# Patient Record
Sex: FEMALE | Race: BLACK OR AFRICAN AMERICAN | NOT HISPANIC OR LATINO | Employment: FULL TIME | RURAL
[De-identification: names, ages, dates, MRNs, and addresses within clinical notes are randomized per-mention and may not be internally consistent; named-entity substitution may affect disease eponyms.]

---

## 2020-10-02 ENCOUNTER — HISTORICAL (OUTPATIENT)
Dept: ADMINISTRATIVE | Facility: HOSPITAL | Age: 35
End: 2020-10-02

## 2020-10-03 LAB
ALBUMIN SERPL BCP-MCNC: 4.4 G/DL (ref 3.5–5)
ALBUMIN/GLOB SERPL: 1 {RATIO}
ALP SERPL-CCNC: 127 U/L (ref 37–98)
ALT SERPL W P-5'-P-CCNC: 19 U/L (ref 13–56)
AMYLASE SERPL-CCNC: 122 U/L (ref 25–115)
ANION GAP SERPL CALCULATED.3IONS-SCNC: 16 MMOL/L
APAP SERPL-MCNC: 3 UG/ML (ref 10–30)
AST SERPL W P-5'-P-CCNC: 11 U/L (ref 15–37)
BASOPHILS # BLD AUTO: 0.03 X10E3/UL (ref 0–0.2)
BASOPHILS NFR BLD AUTO: 0.5 % (ref 0–1)
BILIRUB SERPL-MCNC: 0.5 MG/DL (ref 0–1.2)
BILIRUB UR QL STRIP: NEGATIVE MG/DL
BUN SERPL-MCNC: 16 MG/DL (ref 7–18)
BUN/CREAT SERPL: 15.4
CALCIUM SERPL-MCNC: 10.7 MG/DL (ref 8.5–10.1)
CHLORIDE SERPL-SCNC: 95 MMOL/L (ref 98–107)
CLARITY UR: CLEAR
CO2 SERPL-SCNC: 26 MMOL/L (ref 21–32)
COLOR UR: YELLOW
CREAT SERPL-MCNC: 1.04 MG/DL (ref 0.55–1.02)
EOSINOPHIL # BLD AUTO: 0.04 X10E3/UL (ref 0–0.5)
EOSINOPHIL NFR BLD AUTO: 0.7 % (ref 1–4)
ERYTHROCYTE [DISTWIDTH] IN BLOOD BY AUTOMATED COUNT: 11.7 % (ref 11.5–14.5)
ETHANOL SERPL-MCNC: 1 MG/DL (ref 0–10)
GLOBULIN SER-MCNC: 4.5 G/DL (ref 2–4)
GLUCOSE SERPL-MCNC: 383 MG/DL (ref 74–106)
GLUCOSE UR STRIP-MCNC: >=1000 MG/DL
HCT VFR BLD AUTO: 41.9 % (ref 38–47)
HGB BLD-MCNC: 15 G/DL (ref 12–16)
IMM GRANULOCYTES # BLD AUTO: 0.01 X10E3/UL (ref 0–0.04)
IMM GRANULOCYTES NFR BLD: 0.2 % (ref 0–0.4)
KETONES UR STRIP-SCNC: 15 MG/DL
LEUKOCYTE ESTERASE UR QL STRIP: NEGATIVE LEU/UL
LIPASE SERPL-CCNC: 129 U/L (ref 73–393)
LYMPHOCYTES # BLD AUTO: 2.88 X10E3/UL (ref 1–4.8)
LYMPHOCYTES NFR BLD AUTO: 51 % (ref 27–41)
MCH RBC QN AUTO: 29.5 PG (ref 27–31)
MCHC RBC AUTO-ENTMCNC: 35.8 G/DL (ref 32–36)
MCV RBC AUTO: 82.3 FL (ref 80–96)
MONOCYTES # BLD AUTO: 0.54 X10E3/UL (ref 0–0.8)
MONOCYTES NFR BLD AUTO: 9.6 % (ref 2–6)
MPC BLD CALC-MCNC: 10 FL (ref 9.4–12.4)
NEUTROPHILS # BLD AUTO: 2.15 X10E3/UL (ref 1.8–7.7)
NEUTROPHILS NFR BLD AUTO: 38 % (ref 53–65)
NITRITE UR QL STRIP: NEGATIVE
NRBC # BLD AUTO: 0 X10E3/UL (ref 0–0)
NRBC, AUTO (.00): 0 /100 (ref 0–0)
PH UR STRIP: 5.5 PH UNITS (ref 5–8)
PLATELET # BLD AUTO: 270 X10E3/UL (ref 150–400)
POTASSIUM SERPL-SCNC: 4.3 MMOL/L (ref 3.5–5.1)
PROT SERPL-MCNC: 8.9 G/DL (ref 6.4–8.2)
PROT UR QL STRIP: NEGATIVE MG/DL
RBC # BLD AUTO: 5.09 X10E6/UL (ref 4.2–5.4)
RBC # UR STRIP: NEGATIVE ERY/UL
SALICYLATES SERPL-MCNC: 3.1 MG/DL (ref 3–30)
SODIUM SERPL-SCNC: 133 MMOL/L (ref 136–145)
SP GR UR STRIP: 1.01 (ref 1–1.03)
UROBILINOGEN UR STRIP-ACNC: 0.2 EU/DL
WBC # BLD AUTO: 5.65 X10E3/UL (ref 4.5–11)

## 2020-10-04 LAB
AMPHET UR QL SCN: POSITIVE NG/ML
BARBITURATES UR QL SCN: NEGATIVE NG/ML
BENZODIAZ METAB UR QL SCN: NEGATIVE NG/ML
CANNABINOIDS UR QL SCN: NEGATIVE NG/ML
COCAINE UR QL SCN: NEGATIVE NG/ML
OPIATES UR QL SCN: POSITIVE NG/ML
PCP UR QL SCN: NEGATIVE NG/ML

## 2022-02-28 ENCOUNTER — CLINICAL SUPPORT (OUTPATIENT)
Dept: PRIMARY CARE CLINIC | Facility: CLINIC | Age: 37
End: 2022-02-28

## 2022-02-28 DIAGNOSIS — Z02.89 ENCOUNTER FOR PHYSICAL EXAMINATION RELATED TO EMPLOYMENT: Primary | ICD-10-CM

## 2022-02-28 DIAGNOSIS — Z02.83 ENCOUNTER FOR DRUG SCREENING: ICD-10-CM

## 2022-02-28 PROCEDURE — 99000 SPECIMEN HANDLING OFFICE-LAB: CPT | Mod: ,,, | Performed by: NURSE PRACTITIONER

## 2022-02-28 PROCEDURE — 99000 PR SPECIMEN HANDLING,DR OFF->LAB: ICD-10-PCS | Mod: ,,, | Performed by: NURSE PRACTITIONER

## 2022-02-28 PROCEDURE — 99499 UNLISTED E&M SERVICE: CPT | Mod: ,,, | Performed by: NURSE PRACTITIONER

## 2022-02-28 PROCEDURE — 99499 PR PHYSICAL - BASIC NON DOT/CDL: ICD-10-PCS | Mod: ,,, | Performed by: NURSE PRACTITIONER

## 2022-02-28 NOTE — PROGRESS NOTES
Subjective:       Patient ID: Elieser Hendricks is a 36 y.o. female.    Chief Complaint: No chief complaint on file.    HPI  Review of Systems      Objective:      Physical Exam    Assessment:       Problem List Items Addressed This Visit    None     Visit Diagnoses     Encounter for physical examination related to employment    -  Primary    Encounter for drug screening              Plan:       See scanned documents in .

## 2022-10-18 ENCOUNTER — HOSPITAL ENCOUNTER (EMERGENCY)
Facility: HOSPITAL | Age: 37
Discharge: HOME | End: 2022-10-18
Attending: EMERGENCY MEDICINE

## 2022-10-18 VITALS
WEIGHT: 190 LBS | SYSTOLIC BLOOD PRESSURE: 144 MMHG | HEIGHT: 72 IN | DIASTOLIC BLOOD PRESSURE: 96 MMHG | BODY MASS INDEX: 25.73 KG/M2 | OXYGEN SATURATION: 96 % | RESPIRATION RATE: 16 BRPM | HEART RATE: 102 BPM | TEMPERATURE: 99 F
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
## 2022-10-18 LAB — GLUCOSE SERPL-MCNC: 351 MG/DL (ref 70–105)

## 2022-10-18 PROCEDURE — 96372 THER/PROPH/DIAG INJ SC/IM: CPT

## 2022-10-18 PROCEDURE — 82962 GLUCOSE BLOOD TEST: CPT

## 2022-10-18 PROCEDURE — 63600175 PHARM REV CODE 636 W HCPCS: Performed by: EMERGENCY MEDICINE

## 2022-10-18 PROCEDURE — 99283 EMERGENCY DEPT VISIT LOW MDM: CPT | Mod: ,,, | Performed by: EMERGENCY MEDICINE

## 2022-10-18 PROCEDURE — 99283 PR EMERGENCY DEPT VISIT,LEVEL III: ICD-10-PCS | Mod: ,,, | Performed by: EMERGENCY MEDICINE

## 2022-10-18 PROCEDURE — 90471 IMMUNIZATION ADMIN: CPT | Performed by: EMERGENCY MEDICINE

## 2022-10-18 PROCEDURE — 99284 EMERGENCY DEPT VISIT MOD MDM: CPT

## 2022-10-18 PROCEDURE — 90715 TDAP VACCINE 7 YRS/> IM: CPT | Performed by: EMERGENCY MEDICINE

## 2022-10-18 RX ORDER — AMOXICILLIN AND CLAVULANATE POTASSIUM 875; 125 MG/1; MG/1
1 TABLET, FILM COATED ORAL 2 TIMES DAILY
Qty: 20 TABLET | Refills: 0 | Status: SHIPPED | OUTPATIENT
Start: 2022-10-18

## 2022-10-18 RX ORDER — METFORMIN HYDROCHLORIDE 500 MG/1
500 TABLET ORAL 2 TIMES DAILY WITH MEALS
COMMUNITY

## 2022-10-18 RX ORDER — KETOROLAC TROMETHAMINE 30 MG/ML
30 INJECTION, SOLUTION INTRAMUSCULAR; INTRAVENOUS
Status: COMPLETED | OUTPATIENT
Start: 2022-10-18 | End: 2022-10-18

## 2022-10-18 RX ADMIN — TETANUS TOXOID, REDUCED DIPHTHERIA TOXOID AND ACELLULAR PERTUSSIS VACCINE, ADSORBED 0.5 ML: 5; 2.5; 8; 8; 2.5 SUSPENSION INTRAMUSCULAR at 01:10

## 2022-10-18 RX ADMIN — KETOROLAC TROMETHAMINE 30 MG: 30 INJECTION, SOLUTION INTRAMUSCULAR at 01:10

## 2022-10-18 NOTE — DISCHARGE INSTRUCTIONS
Take augmentin as directed.  Soak in warm water 3 or 4 times daily.  Return for any increased swelling, redness, pain or drainage or otherwise as needed.

## 2022-10-18 NOTE — ED PROVIDER NOTES
Encounter Date: 10/18/2022       History     Chief Complaint   Patient presents with    Animal Bite     Bit by own dog.  Dog is UTD on shots      38 y/o female with dog bite to right wrist.  Dog belongs to patient.  It was in an altercation with another of her dogs.  Bleeding is controlled with pressure.      Review of patient's allergies indicates:  No Known Allergies  Past Medical History:   Diagnosis Date    Diabetes mellitus      History reviewed. No pertinent surgical history.  History reviewed. No pertinent family history.  Social History     Tobacco Use    Smoking status: Never    Smokeless tobacco: Never   Substance Use Topics    Alcohol use: Never    Drug use: Never     Review of Systems   All other systems reviewed and are negative.    Physical Exam     Initial Vitals [10/18/22 1301]   BP Pulse Resp Temp SpO2   (!) 144/96 102 16 99.1 °F (37.3 °C) 96 %      MAP       --         Physical Exam    Nursing note and vitals reviewed.  Constitutional: She appears well-developed and well-nourished.   HENT:   Head: Normocephalic and atraumatic.   Nose: Nose normal.   Mouth/Throat: Oropharynx is clear and moist.   Eyes: Conjunctivae and EOM are normal. Pupils are equal, round, and reactive to light.   Neck: Neck supple.   Normal range of motion.  Cardiovascular:  Normal rate, regular rhythm, normal heart sounds and intact distal pulses.           Pulmonary/Chest: Breath sounds normal.   Abdominal: Abdomen is soft. Bowel sounds are normal.   Musculoskeletal:         General: Normal range of motion.      Cervical back: Normal range of motion and neck supple.     Neurological: She is alert and oriented to person, place, and time. She has normal strength. GCS score is 15. GCS eye subscore is 4. GCS verbal subscore is 5. GCS motor subscore is 6.   Skin: Skin is warm and dry. Capillary refill takes less than 2 seconds.   Psychiatric: She has a normal mood and affect.       Medical Screening Exam   See Full Note    ED  Course   Procedures  Labs Reviewed   POCT GLUCOSE MONITORING CONTINUOUS - Abnormal; Notable for the following components:       Result Value    POC Glucose 351 (*)     All other components within normal limits          Imaging Results    None          Medications   Tdap (BOOSTRIX) vaccine injection 0.5 mL (has no administration in time range)   ketorolac injection 30 mg (has no administration in time range)                       Clinical Impression:   Final diagnoses:  [W54.0XXA] Dog bite, initial encounter (Primary)      ED Disposition Condition    Discharge Stable          ED Prescriptions    None       Follow-up Information    None          Juan Manuel Cosby MD  10/18/22 3068

## 2022-10-26 ENCOUNTER — LAB VISIT (OUTPATIENT)
Dept: PRIMARY CARE CLINIC | Facility: CLINIC | Age: 37
End: 2022-10-26

## 2022-10-26 DIAGNOSIS — Z02.83 ENCOUNTER FOR EMPLOYMENT-RELATED DRUG TESTING: Primary | ICD-10-CM

## 2022-10-26 PROCEDURE — 99000 SPECIMEN HANDLING OFFICE-LAB: CPT | Mod: ,,, | Performed by: NURSE PRACTITIONER

## 2022-10-26 PROCEDURE — 99000 PR SPECIMEN HANDLING,DR OFF->LAB: ICD-10-PCS | Mod: ,,, | Performed by: NURSE PRACTITIONER

## 2022-10-26 NOTE — PROGRESS NOTES
Subjective:       Patient ID: Elieser Hendricks is a 37 y.o. female.    Chief Complaint: No chief complaint on file.    HPI  Review of Systems      Objective:      Physical Exam    Assessment:       Problem List Items Addressed This Visit    None  Visit Diagnoses       Encounter for employment-related drug testing    -  Primary            Plan:       Drug testing only

## 2025-07-02 ENCOUNTER — HOSPITAL ENCOUNTER (INPATIENT)
Facility: HOSPITAL | Age: 40
LOS: 3 days | Discharge: HOME | DRG: 623 | End: 2025-07-07
Attending: FAMILY MEDICINE | Admitting: FAMILY MEDICINE

## 2025-07-02 ENCOUNTER — ANESTHESIA (OUTPATIENT)
Dept: SURGERY | Facility: HOSPITAL | Age: 40
End: 2025-07-02

## 2025-07-02 ENCOUNTER — ANESTHESIA EVENT (OUTPATIENT)
Dept: SURGERY | Facility: HOSPITAL | Age: 40
End: 2025-07-02

## 2025-07-02 DIAGNOSIS — S71.002A OPEN WOUND OF LEFT HIP, INITIAL ENCOUNTER: ICD-10-CM

## 2025-07-02 DIAGNOSIS — S91.109A: ICD-10-CM

## 2025-07-02 DIAGNOSIS — R07.9 CHEST PAIN: ICD-10-CM

## 2025-07-02 DIAGNOSIS — M86.9 OSTEOMYELITIS: ICD-10-CM

## 2025-07-02 DIAGNOSIS — M86.9 OSTEOMYELITIS OF OTHER SITE, UNSPECIFIED TYPE: ICD-10-CM

## 2025-07-02 DIAGNOSIS — L02.91 ABSCESS: Primary | ICD-10-CM

## 2025-07-02 DIAGNOSIS — L89.222 DECUBITUS ULCER OF LEFT HIP, STAGE 2: ICD-10-CM

## 2025-07-02 PROBLEM — L97.519 DIABETIC ULCER OF RIGHT GREAT TOE: Status: ACTIVE | Noted: 2025-07-02

## 2025-07-02 PROBLEM — L97.529 DIABETIC ULCER OF LEFT GREAT TOE: Status: ACTIVE | Noted: 2025-07-02

## 2025-07-02 PROBLEM — E11.621 DIABETIC ULCER OF LEFT GREAT TOE: Status: ACTIVE | Noted: 2025-07-02

## 2025-07-02 PROBLEM — E11.621 DIABETIC ULCER OF RIGHT GREAT TOE: Status: ACTIVE | Noted: 2025-07-02

## 2025-07-02 LAB
ANION GAP SERPL CALCULATED.3IONS-SCNC: 16 MMOL/L (ref 7–16)
APTT PPP: 30.6 SECONDS (ref 25.2–37.3)
B-HCG UR QL: NEGATIVE
BASOPHILS # BLD AUTO: 0.04 K/UL (ref 0–0.2)
BASOPHILS NFR BLD AUTO: 0.6 % (ref 0–1)
BUN SERPL-MCNC: 22 MG/DL (ref 7–19)
BUN/CREAT SERPL: 24 (ref 6–20)
CALCIUM SERPL-MCNC: 10.1 MG/DL (ref 8.4–10.2)
CHLORIDE SERPL-SCNC: 98 MMOL/L (ref 98–107)
CO2 SERPL-SCNC: 24 MMOL/L (ref 22–29)
CREAT SERPL-MCNC: 0.92 MG/DL (ref 0.55–1.02)
CTP QC/QA: YES
DIFFERENTIAL METHOD BLD: ABNORMAL
EGFR (NO RACE VARIABLE) (RUSH/TITUS): 81 ML/MIN/1.73M2
EOSINOPHIL # BLD AUTO: 0.32 K/UL (ref 0–0.5)
EOSINOPHIL NFR BLD AUTO: 5.1 % (ref 1–4)
ERYTHROCYTE [DISTWIDTH] IN BLOOD BY AUTOMATED COUNT: 12.1 % (ref 11.5–14.5)
GLUCOSE SERPL-MCNC: 219 MG/DL (ref 70–105)
GLUCOSE SERPL-MCNC: 241 MG/DL (ref 70–105)
GLUCOSE SERPL-MCNC: 295 MG/DL (ref 70–105)
GLUCOSE SERPL-MCNC: 342 MG/DL (ref 74–100)
GLUCOSE SERPL-MCNC: 406 MG/DL (ref 70–105)
HCT VFR BLD AUTO: 35.2 % (ref 38–47)
HGB BLD-MCNC: 11.9 G/DL (ref 12–16)
IMM GRANULOCYTES # BLD AUTO: 0.04 K/UL (ref 0–0.04)
IMM GRANULOCYTES NFR BLD: 0.6 % (ref 0–0.4)
INR BLD: 0.93
LYMPHOCYTES # BLD AUTO: 2.77 K/UL (ref 1–4.8)
LYMPHOCYTES NFR BLD AUTO: 43.8 % (ref 27–41)
MCH RBC QN AUTO: 27.1 PG (ref 27–31)
MCHC RBC AUTO-ENTMCNC: 33.8 G/DL (ref 32–36)
MCV RBC AUTO: 80.2 FL (ref 80–96)
MONOCYTES # BLD AUTO: 0.65 K/UL (ref 0–0.8)
MONOCYTES NFR BLD AUTO: 10.3 % (ref 2–6)
MPC BLD CALC-MCNC: 9.8 FL (ref 9.4–12.4)
NEUTROPHILS # BLD AUTO: 2.5 K/UL (ref 1.8–7.7)
NEUTROPHILS NFR BLD AUTO: 39.6 % (ref 53–65)
NRBC # BLD AUTO: 0 X10E3/UL
NRBC, AUTO (.00): 0 %
PLATELET # BLD AUTO: 457 K/UL (ref 150–400)
POTASSIUM SERPL-SCNC: 5 MMOL/L (ref 3.5–5.1)
PROTHROMBIN TIME: 12.6 SECONDS (ref 11.7–14.7)
RBC # BLD AUTO: 4.39 M/UL (ref 4.2–5.4)
SODIUM SERPL-SCNC: 133 MMOL/L (ref 136–145)
WBC # BLD AUTO: 6.32 K/UL (ref 4.5–11)

## 2025-07-02 PROCEDURE — 0JBM0ZZ EXCISION OF LEFT UPPER LEG SUBCUTANEOUS TISSUE AND FASCIA, OPEN APPROACH: ICD-10-PCS | Performed by: STUDENT IN AN ORGANIZED HEALTH CARE EDUCATION/TRAINING PROGRAM

## 2025-07-02 PROCEDURE — 85025 COMPLETE CBC W/AUTO DIFF WBC: CPT | Performed by: NURSE PRACTITIONER

## 2025-07-02 PROCEDURE — 87075 CULTR BACTERIA EXCEPT BLOOD: CPT | Performed by: STUDENT IN AN ORGANIZED HEALTH CARE EDUCATION/TRAINING PROGRAM

## 2025-07-02 PROCEDURE — G0378 HOSPITAL OBSERVATION PER HR: HCPCS

## 2025-07-02 PROCEDURE — 36000707: Performed by: STUDENT IN AN ORGANIZED HEALTH CARE EDUCATION/TRAINING PROGRAM

## 2025-07-02 PROCEDURE — 96361 HYDRATE IV INFUSION ADD-ON: CPT

## 2025-07-02 PROCEDURE — 37000009 HC ANESTHESIA EA ADD 15 MINS: Performed by: STUDENT IN AN ORGANIZED HEALTH CARE EDUCATION/TRAINING PROGRAM

## 2025-07-02 PROCEDURE — 82962 GLUCOSE BLOOD TEST: CPT

## 2025-07-02 PROCEDURE — 27000510 HC BLANKET BAIR HUGGER ANY SIZE: Performed by: ANESTHESIOLOGY

## 2025-07-02 PROCEDURE — 88304 TISSUE EXAM BY PATHOLOGIST: CPT | Mod: 26,,, | Performed by: PATHOLOGY

## 2025-07-02 PROCEDURE — 96375 TX/PRO/DX INJ NEW DRUG ADDON: CPT

## 2025-07-02 PROCEDURE — 71000033 HC RECOVERY, INTIAL HOUR: Performed by: STUDENT IN AN ORGANIZED HEALTH CARE EDUCATION/TRAINING PROGRAM

## 2025-07-02 PROCEDURE — 37000008 HC ANESTHESIA 1ST 15 MINUTES: Performed by: STUDENT IN AN ORGANIZED HEALTH CARE EDUCATION/TRAINING PROGRAM

## 2025-07-02 PROCEDURE — 88304 TISSUE EXAM BY PATHOLOGIST: CPT | Mod: TC,SUR | Performed by: STUDENT IN AN ORGANIZED HEALTH CARE EDUCATION/TRAINING PROGRAM

## 2025-07-02 PROCEDURE — 85730 THROMBOPLASTIN TIME PARTIAL: CPT | Performed by: NURSE PRACTITIONER

## 2025-07-02 PROCEDURE — 36000706: Performed by: STUDENT IN AN ORGANIZED HEALTH CARE EDUCATION/TRAINING PROGRAM

## 2025-07-02 PROCEDURE — 27000177 HC AIRWAY, LARYNGEAL MASK: Performed by: ANESTHESIOLOGY

## 2025-07-02 PROCEDURE — 0JBQ0ZZ EXCISION OF RIGHT FOOT SUBCUTANEOUS TISSUE AND FASCIA, OPEN APPROACH: ICD-10-PCS | Performed by: STUDENT IN AN ORGANIZED HEALTH CARE EDUCATION/TRAINING PROGRAM

## 2025-07-02 PROCEDURE — 80048 BASIC METABOLIC PNL TOTAL CA: CPT | Performed by: NURSE PRACTITIONER

## 2025-07-02 PROCEDURE — 71000039 HC RECOVERY, EACH ADD'L HOUR: Performed by: STUDENT IN AN ORGANIZED HEALTH CARE EDUCATION/TRAINING PROGRAM

## 2025-07-02 PROCEDURE — 25000003 PHARM REV CODE 250: Performed by: NURSE PRACTITIONER

## 2025-07-02 PROCEDURE — 81025 URINE PREGNANCY TEST: CPT | Performed by: FAMILY MEDICINE

## 2025-07-02 PROCEDURE — 63600175 PHARM REV CODE 636 W HCPCS: Performed by: STUDENT IN AN ORGANIZED HEALTH CARE EDUCATION/TRAINING PROGRAM

## 2025-07-02 PROCEDURE — 25000003 PHARM REV CODE 250

## 2025-07-02 PROCEDURE — 25000003 PHARM REV CODE 250: Performed by: STUDENT IN AN ORGANIZED HEALTH CARE EDUCATION/TRAINING PROGRAM

## 2025-07-02 PROCEDURE — 99223 1ST HOSP IP/OBS HIGH 75: CPT | Mod: ,,, | Performed by: FAMILY MEDICINE

## 2025-07-02 PROCEDURE — 0JBR0ZZ EXCISION OF LEFT FOOT SUBCUTANEOUS TISSUE AND FASCIA, OPEN APPROACH: ICD-10-PCS | Performed by: STUDENT IN AN ORGANIZED HEALTH CARE EDUCATION/TRAINING PROGRAM

## 2025-07-02 PROCEDURE — 63600175 PHARM REV CODE 636 W HCPCS

## 2025-07-02 PROCEDURE — 87070 CULTURE OTHR SPECIMN AEROBIC: CPT | Performed by: STUDENT IN AN ORGANIZED HEALTH CARE EDUCATION/TRAINING PROGRAM

## 2025-07-02 PROCEDURE — 27000655: Performed by: ANESTHESIOLOGY

## 2025-07-02 PROCEDURE — 96372 THER/PROPH/DIAG INJ SC/IM: CPT

## 2025-07-02 PROCEDURE — 63600175 PHARM REV CODE 636 W HCPCS: Performed by: NURSE PRACTITIONER

## 2025-07-02 PROCEDURE — 85610 PROTHROMBIN TIME: CPT | Performed by: NURSE PRACTITIONER

## 2025-07-02 PROCEDURE — 27000716 HC OXISENSOR PROBE, ANY SIZE: Performed by: ANESTHESIOLOGY

## 2025-07-02 PROCEDURE — 99285 EMERGENCY DEPT VISIT HI MDM: CPT | Mod: 25

## 2025-07-02 PROCEDURE — 96365 THER/PROPH/DIAG IV INF INIT: CPT

## 2025-07-02 PROCEDURE — 63600175 PHARM REV CODE 636 W HCPCS: Performed by: ANESTHESIOLOGY

## 2025-07-02 RX ORDER — ONDANSETRON HYDROCHLORIDE 2 MG/ML
4 INJECTION, SOLUTION INTRAVENOUS EVERY 6 HOURS PRN
Status: DISCONTINUED | OUTPATIENT
Start: 2025-07-02 | End: 2025-07-02

## 2025-07-02 RX ORDER — SODIUM CHLORIDE, SODIUM LACTATE, POTASSIUM CHLORIDE, CALCIUM CHLORIDE 600; 310; 30; 20 MG/100ML; MG/100ML; MG/100ML; MG/100ML
125 INJECTION, SOLUTION INTRAVENOUS CONTINUOUS
Status: DISCONTINUED | OUTPATIENT
Start: 2025-07-02 | End: 2025-07-04

## 2025-07-02 RX ORDER — DIPHENHYDRAMINE HYDROCHLORIDE 50 MG/ML
INJECTION, SOLUTION INTRAMUSCULAR; INTRAVENOUS
Status: DISCONTINUED | OUTPATIENT
Start: 2025-07-02 | End: 2025-07-02

## 2025-07-02 RX ORDER — NALOXONE HCL 0.4 MG/ML
0.02 VIAL (ML) INJECTION
Status: DISCONTINUED | OUTPATIENT
Start: 2025-07-02 | End: 2025-07-07 | Stop reason: HOSPADM

## 2025-07-02 RX ORDER — ONDANSETRON HYDROCHLORIDE 2 MG/ML
4 INJECTION, SOLUTION INTRAVENOUS DAILY PRN
Status: DISCONTINUED | OUTPATIENT
Start: 2025-07-02 | End: 2025-07-02 | Stop reason: HOSPADM

## 2025-07-02 RX ORDER — TEMAZEPAM 30 MG/1
30 CAPSULE ORAL NIGHTLY PRN
Status: ON HOLD | COMMUNITY
Start: 2025-06-03 | End: 2025-07-07 | Stop reason: HOSPADM

## 2025-07-02 RX ORDER — OXYCODONE HYDROCHLORIDE 5 MG/1
5 TABLET ORAL
Status: DISCONTINUED | OUTPATIENT
Start: 2025-07-02 | End: 2025-07-02 | Stop reason: HOSPADM

## 2025-07-02 RX ORDER — DEXTROAMPHETAMINE SACCHARATE, AMPHETAMINE ASPARTATE MONOHYDRATE, DEXTROAMPHETAMINE SULFATE AND AMPHETAMINE SULFATE 7.5; 7.5; 7.5; 7.5 MG/1; MG/1; MG/1; MG/1
30 CAPSULE, EXTENDED RELEASE ORAL EVERY MORNING
COMMUNITY
Start: 2025-06-19

## 2025-07-02 RX ORDER — INSULIN ASPART 100 [IU]/ML
10 INJECTION, SUSPENSION SUBCUTANEOUS
Status: DISCONTINUED | OUTPATIENT
Start: 2025-07-03 | End: 2025-07-03

## 2025-07-02 RX ORDER — PROPOFOL 10 MG/ML
VIAL (ML) INTRAVENOUS
Status: DISCONTINUED | OUTPATIENT
Start: 2025-07-02 | End: 2025-07-02

## 2025-07-02 RX ORDER — ONDANSETRON HYDROCHLORIDE 2 MG/ML
4 INJECTION, SOLUTION INTRAVENOUS EVERY 6 HOURS PRN
Status: DISCONTINUED | OUTPATIENT
Start: 2025-07-02 | End: 2025-07-07 | Stop reason: HOSPADM

## 2025-07-02 RX ORDER — INSULIN ASPART 100 [IU]/ML
INJECTION, SUSPENSION SUBCUTANEOUS
Status: ON HOLD | COMMUNITY
Start: 2025-06-24 | End: 2025-07-07

## 2025-07-02 RX ORDER — FENTANYL CITRATE 50 UG/ML
INJECTION, SOLUTION INTRAMUSCULAR; INTRAVENOUS
Status: DISCONTINUED | OUTPATIENT
Start: 2025-07-02 | End: 2025-07-02

## 2025-07-02 RX ORDER — GLUCAGON 1 MG
1 KIT INJECTION
Status: DISCONTINUED | OUTPATIENT
Start: 2025-07-02 | End: 2025-07-02 | Stop reason: HOSPADM

## 2025-07-02 RX ORDER — IPRATROPIUM BROMIDE AND ALBUTEROL SULFATE 2.5; .5 MG/3ML; MG/3ML
3 SOLUTION RESPIRATORY (INHALATION)
Status: DISCONTINUED | OUTPATIENT
Start: 2025-07-02 | End: 2025-07-02 | Stop reason: HOSPADM

## 2025-07-02 RX ORDER — MORPHINE SULFATE 10 MG/ML
4 INJECTION INTRAMUSCULAR; INTRAVENOUS; SUBCUTANEOUS EVERY 5 MIN PRN
Status: DISCONTINUED | OUTPATIENT
Start: 2025-07-02 | End: 2025-07-02 | Stop reason: HOSPADM

## 2025-07-02 RX ORDER — PROCHLORPERAZINE EDISYLATE 5 MG/ML
5 INJECTION INTRAMUSCULAR; INTRAVENOUS EVERY 6 HOURS PRN
Status: DISCONTINUED | OUTPATIENT
Start: 2025-07-02 | End: 2025-07-07 | Stop reason: HOSPADM

## 2025-07-02 RX ORDER — INSULIN ASPART 100 [IU]/ML
14 INJECTION, SOLUTION INTRAVENOUS; SUBCUTANEOUS
Status: DISCONTINUED | OUTPATIENT
Start: 2025-07-02 | End: 2025-07-03

## 2025-07-02 RX ORDER — GLUCAGON 1 MG
1 KIT INJECTION
Status: DISCONTINUED | OUTPATIENT
Start: 2025-07-02 | End: 2025-07-07 | Stop reason: HOSPADM

## 2025-07-02 RX ORDER — HYDROMORPHONE HYDROCHLORIDE 2 MG/ML
0.5 INJECTION, SOLUTION INTRAMUSCULAR; INTRAVENOUS; SUBCUTANEOUS
Status: DISCONTINUED | OUTPATIENT
Start: 2025-07-02 | End: 2025-07-07 | Stop reason: HOSPADM

## 2025-07-02 RX ORDER — HYDROMORPHONE HYDROCHLORIDE 2 MG/ML
0.5 INJECTION, SOLUTION INTRAMUSCULAR; INTRAVENOUS; SUBCUTANEOUS EVERY 5 MIN PRN
Status: DISCONTINUED | OUTPATIENT
Start: 2025-07-02 | End: 2025-07-02 | Stop reason: HOSPADM

## 2025-07-02 RX ORDER — SODIUM CHLORIDE 0.9 % (FLUSH) 0.9 %
10 SYRINGE (ML) INJECTION EVERY 12 HOURS PRN
Status: DISCONTINUED | OUTPATIENT
Start: 2025-07-02 | End: 2025-07-07 | Stop reason: HOSPADM

## 2025-07-02 RX ORDER — MIDAZOLAM HYDROCHLORIDE 1 MG/ML
INJECTION INTRAMUSCULAR; INTRAVENOUS
Status: DISCONTINUED | OUTPATIENT
Start: 2025-07-02 | End: 2025-07-02

## 2025-07-02 RX ORDER — OXYCODONE AND ACETAMINOPHEN 5; 325 MG/1; MG/1
1 TABLET ORAL EVERY 6 HOURS PRN
Status: DISCONTINUED | OUTPATIENT
Start: 2025-07-02 | End: 2025-07-07 | Stop reason: HOSPADM

## 2025-07-02 RX ORDER — IBUPROFEN 200 MG
24 TABLET ORAL
Status: DISCONTINUED | OUTPATIENT
Start: 2025-07-02 | End: 2025-07-07 | Stop reason: HOSPADM

## 2025-07-02 RX ORDER — PHENYLEPHRINE HYDROCHLORIDE 10 MG/ML
INJECTION INTRAVENOUS
Status: DISCONTINUED | OUTPATIENT
Start: 2025-07-02 | End: 2025-07-02

## 2025-07-02 RX ORDER — ONDANSETRON HYDROCHLORIDE 2 MG/ML
INJECTION, SOLUTION INTRAVENOUS
Status: DISCONTINUED | OUTPATIENT
Start: 2025-07-02 | End: 2025-07-02

## 2025-07-02 RX ORDER — DIPHENHYDRAMINE HYDROCHLORIDE 50 MG/ML
25 INJECTION, SOLUTION INTRAMUSCULAR; INTRAVENOUS EVERY 6 HOURS PRN
Status: DISCONTINUED | OUTPATIENT
Start: 2025-07-02 | End: 2025-07-02 | Stop reason: HOSPADM

## 2025-07-02 RX ORDER — IBUPROFEN 200 MG
16 TABLET ORAL
Status: DISCONTINUED | OUTPATIENT
Start: 2025-07-02 | End: 2025-07-07 | Stop reason: HOSPADM

## 2025-07-02 RX ORDER — INSULIN ASPART 100 [IU]/ML
0-10 INJECTION, SOLUTION INTRAVENOUS; SUBCUTANEOUS
Status: DISCONTINUED | OUTPATIENT
Start: 2025-07-02 | End: 2025-07-07 | Stop reason: HOSPADM

## 2025-07-02 RX ORDER — MEPERIDINE HYDROCHLORIDE 25 MG/ML
25 INJECTION INTRAMUSCULAR; INTRAVENOUS; SUBCUTANEOUS EVERY 10 MIN PRN
Status: DISCONTINUED | OUTPATIENT
Start: 2025-07-02 | End: 2025-07-02 | Stop reason: HOSPADM

## 2025-07-02 RX ORDER — LIDOCAINE HYDROCHLORIDE 20 MG/ML
INJECTION, SOLUTION EPIDURAL; INFILTRATION; INTRACAUDAL; PERINEURAL
Status: DISCONTINUED | OUTPATIENT
Start: 2025-07-02 | End: 2025-07-02

## 2025-07-02 RX ORDER — CITALOPRAM 20 MG/1
40 TABLET ORAL DAILY
Status: DISCONTINUED | OUTPATIENT
Start: 2025-07-03 | End: 2025-07-07 | Stop reason: HOSPADM

## 2025-07-02 RX ORDER — EPHEDRINE SULFATE 50 MG/ML
INJECTION, SOLUTION INTRAVENOUS
Status: DISCONTINUED | OUTPATIENT
Start: 2025-07-02 | End: 2025-07-02

## 2025-07-02 RX ORDER — ENOXAPARIN SODIUM 100 MG/ML
40 INJECTION SUBCUTANEOUS EVERY 24 HOURS
Status: DISCONTINUED | OUTPATIENT
Start: 2025-07-02 | End: 2025-07-07 | Stop reason: HOSPADM

## 2025-07-02 RX ORDER — ZOLPIDEM TARTRATE 5 MG/1
5 TABLET ORAL NIGHTLY PRN
Status: DISCONTINUED | OUTPATIENT
Start: 2025-07-02 | End: 2025-07-04

## 2025-07-02 RX ORDER — MORPHINE SULFATE 4 MG/ML
4 INJECTION, SOLUTION INTRAMUSCULAR; INTRAVENOUS EVERY 4 HOURS PRN
Status: DISCONTINUED | OUTPATIENT
Start: 2025-07-02 | End: 2025-07-02

## 2025-07-02 RX ORDER — CLINDAMYCIN HYDROCHLORIDE 300 MG/1
300 CAPSULE ORAL EVERY 12 HOURS
Status: ON HOLD | COMMUNITY
Start: 2025-06-23 | End: 2025-07-07 | Stop reason: HOSPADM

## 2025-07-02 RX ORDER — GABAPENTIN 400 MG/1
400 CAPSULE ORAL 2 TIMES DAILY
Status: DISCONTINUED | OUTPATIENT
Start: 2025-07-02 | End: 2025-07-07 | Stop reason: HOSPADM

## 2025-07-02 RX ORDER — ACETAMINOPHEN 10 MG/ML
1000 INJECTION, SOLUTION INTRAVENOUS ONCE
Status: COMPLETED | OUTPATIENT
Start: 2025-07-02 | End: 2025-07-02

## 2025-07-02 RX ADMIN — FENTANYL CITRATE 50 MCG: 50 INJECTION, SOLUTION INTRAMUSCULAR; INTRAVENOUS at 04:07

## 2025-07-02 RX ADMIN — PIPERACILLIN SODIUM AND TAZOBACTAM SODIUM 4.5 G: 4; .5 INJECTION, POWDER, LYOPHILIZED, FOR SOLUTION INTRAVENOUS at 02:07

## 2025-07-02 RX ADMIN — HYDROMORPHONE HYDROCHLORIDE 0.5 MG: 2 INJECTION INTRAMUSCULAR; INTRAVENOUS; SUBCUTANEOUS at 05:07

## 2025-07-02 RX ADMIN — GABAPENTIN 400 MG: 400 CAPSULE ORAL at 09:07

## 2025-07-02 RX ADMIN — SODIUM CHLORIDE 1750 MG: 9 INJECTION, SOLUTION INTRAVENOUS at 09:07

## 2025-07-02 RX ADMIN — DIPHENHYDRAMINE HYDROCHLORIDE 25 MG: 50 INJECTION, SOLUTION INTRAMUSCULAR; INTRAVENOUS at 04:07

## 2025-07-02 RX ADMIN — MIDAZOLAM HYDROCHLORIDE 2 MG: 1 INJECTION, SOLUTION INTRAMUSCULAR; INTRAVENOUS at 04:07

## 2025-07-02 RX ADMIN — SODIUM CHLORIDE, POTASSIUM CHLORIDE, SODIUM LACTATE AND CALCIUM CHLORIDE 125 ML/HR: 600; 310; 30; 20 INJECTION, SOLUTION INTRAVENOUS at 05:07

## 2025-07-02 RX ADMIN — ONDANSETRON 4 MG: 2 INJECTION INTRAMUSCULAR; INTRAVENOUS at 04:07

## 2025-07-02 RX ADMIN — SODIUM CHLORIDE 1000 ML: 9 INJECTION, SOLUTION INTRAVENOUS at 12:07

## 2025-07-02 RX ADMIN — PHENYLEPHRINE HYDROCHLORIDE 200 MCG: 10 INJECTION INTRAVENOUS at 04:07

## 2025-07-02 RX ADMIN — EPHEDRINE SULFATE 20 MG: 50 INJECTION INTRAVENOUS at 04:07

## 2025-07-02 RX ADMIN — SODIUM CHLORIDE 1000 ML: 9 INJECTION, SOLUTION INTRAVENOUS at 02:07

## 2025-07-02 RX ADMIN — EPHEDRINE SULFATE 10 MG: 50 INJECTION INTRAVENOUS at 04:07

## 2025-07-02 RX ADMIN — HUMAN INSULIN 6 UNITS: 100 INJECTION, SOLUTION SUBCUTANEOUS at 01:07

## 2025-07-02 RX ADMIN — HYDROMORPHONE HYDROCHLORIDE 0.5 MG: 2 INJECTION INTRAMUSCULAR; INTRAVENOUS; SUBCUTANEOUS at 09:07

## 2025-07-02 RX ADMIN — LIDOCAINE HYDROCHLORIDE 100 MG: 20 INJECTION, SOLUTION EPIDURAL; INFILTRATION; INTRACAUDAL; PERINEURAL at 04:07

## 2025-07-02 RX ADMIN — ACETAMINOPHEN 1000 MG: 10 INJECTION INTRAVENOUS at 05:07

## 2025-07-02 RX ADMIN — SODIUM CHLORIDE, POTASSIUM CHLORIDE, SODIUM LACTATE AND CALCIUM CHLORIDE: 600; 310; 30; 20 INJECTION, SOLUTION INTRAVENOUS at 04:07

## 2025-07-02 RX ADMIN — INSULIN ASPART 2 UNITS: 100 INJECTION, SOLUTION INTRAVENOUS; SUBCUTANEOUS at 09:07

## 2025-07-02 RX ADMIN — PROPOFOL 200 MG: 10 INJECTION, EMULSION INTRAVENOUS at 04:07

## 2025-07-02 NOTE — ASSESSMENT & PLAN NOTE
"Patient's FSGs are uncontrolled due to hyperglycemia on current medication regimen.  Last A1c reviewed- No results found for: "LABA1C", "HGBA1C"  Most recent fingerstick glucose reviewed- No results for input(s): "POCTGLUCOSE" in the last 24 hours.  Current correctional scale  Medium  Maintain anti-hyperglycemic dose as follows-   Antihyperglycemics (From admission, onward)      Start     Stop Route Frequency Ordered    07/03/25 0715  insulin asp prt-insulin aspart (NovoLOG 70/30) injection 10 Units         -- SubQ with breakfast 07/02/25 1553    07/02/25 1645  insulin aspart U-100 injection 14 Units         -- SubQ with dinner 07/02/25 1553          Hold Oral hypoglycemics while patient is in the hospital.    Continue with zosyn/vancomycin IV.  Surgery consulted.  NPO after midnight.  "

## 2025-07-02 NOTE — PROGRESS NOTES
"Pharmacokinetic Initial Assessment: IV Vancomycin    Assessment/Plan:    Initiate intravenous vancomycin as 1750 mg IV q12h  Desired empiric serum trough concentration is 15 to 20 mcg/mL  Draw vancomycin trough level 30 min prior to fourth dose on 7/4 at approximately 0930  Pharmacy will continue to follow and monitor vancomycin.      Please contact pharmacy at extension [phone] with any questions regarding this assessment.     Patient brief summary:  Elieser Hendricks is a 40 y.o. female initiated on antimicrobial therapy with IV Vancomycin for treatment of suspected bone/joint infection    Drug Allergies:   Review of patient's allergies indicates:  No Known Allergies    Actual Body Weight:   86.2 kg    Renal Function:   Estimated Creatinine Clearance: 99.7 mL/min (based on SCr of 0.92 mg/dL).,     Dialysis Method (if applicable):  N/A    CBC (last 72 hours):  Recent Labs   Lab Result Units 07/02/25  1158   WBC K/uL 6.32   Hemoglobin g/dL 11.9*   Hematocrit % 35.2*   Platelet Count K/uL 457*   Lymphocytes % % 43.8*   Monocytes % % 10.3*   Eosinophils % % 5.1*   Basophils % % 0.6   Diff Type  Auto       Metabolic Panel (last 72 hours):  Recent Labs   Lab Result Units 07/02/25  1158   Sodium mmol/L 133*   Potassium mmol/L 5.0   Chloride mmol/L 98   CO2 mmol/L 24   Glucose mg/dL 342*   BUN mg/dL 22*   Creatinine mg/dL 0.92       Drug levels (last 3 results):  No results for input(s): "VANCOMYCINRA", "VANCORANDOM", "VANCOMYCINPE", "VANCOPEAK", "VANCOMYCINTR", "VANCOTROUGH" in the last 72 hours.    Microbiologic Results:  Microbiology Results (last 7 days)       ** No results found for the last 168 hours. **            "

## 2025-07-02 NOTE — HPI
Mrs. Hendricks is a 39 yo female with pmh of t2dm, htn, adhd, anxiety/depression, and ptsd. Presented to the ED with complaints of worsening pain, redness, and swelling to bilateral great toes along with left hip wound.  Symptoms started approximately 3 weeks ago and have gradually worsened. Denies injury.  She reports that she's been following with Nurse Herman at [facility]. She's been taking clindamycin without any improvement.  She endorses nausea/vomiting and abdominal discomfort. Denies fever/chills, chest pain, shortness of breath, diarrhea, constipation.      Upon arrival to the ED, vital signs were stable. WBC normal.  Bilateral foot xray reveals soft tissue swelling in right great toe with questionable osteomyelitis of distal phalanx.  General surgery was consulted while patient was in ED.  She's being admitted to hospitalist service for further evaluation and treatment.

## 2025-07-02 NOTE — ANESTHESIA PROCEDURE NOTES
Intubation    Date/Time: 7/2/2025 4:09 PM    Performed by: Dexter Ray CRNA  Authorized by: Saulo Castro MD    Intubation:     Induction:  Intravenous    Intubated:  Postinduction    Mask Ventilation:  Easy mask    Attempts:  1    Attempted By:  CRNA    Difficult Airway Encountered?: No      Complications:  None    Airway Device:  Supraglottic airway/LMA    Airway Device Size:  4.0    Style/Cuff Inflation:  Cuffed (inflated to minimal occlusive pressure)    Secured at:  The lips    Placement Verified By:  Capnometry    Complicating Factors:  None    Findings Post-Intubation:  BS equal bilateral and atraumatic/condition of teeth unchanged

## 2025-07-02 NOTE — ED PROVIDER NOTES
Encounter Date: 7/2/2025       History     Chief Complaint   Patient presents with    Wound Check     Patient here for apparent abscess to left hip and may need I&D. Patient c/o ulcers to bilateral great toes      40-year-old female presents to the emergency department to be evaluated for vent wounds on her bilateral great toes in her left hip.  All of her wounds have been there for about 3 weeks.  She was evaluated in clinic loss week and started on clindamycin.  She said that she followed up today was instructed to come to the emergency department.    The history is provided by the patient.     Review of patient's allergies indicates:  No Known Allergies  Past Medical History:   Diagnosis Date    Diabetes mellitus      History reviewed. No pertinent surgical history.  No family history on file.  Social History[1]  Review of Systems   Constitutional:  Negative for chills and fever.   Cardiovascular:  Negative for chest pain and leg swelling.   All other systems reviewed and are negative.      Physical Exam     Initial Vitals   BP Pulse Resp Temp SpO2   07/02/25 1121 07/02/25 1121 07/02/25 1120 07/02/25 1121 07/02/25 1121   128/89 98 16 98.1 °F (36.7 °C) 97 %      MAP       --                Physical Exam    Vitals reviewed.  Constitutional: She appears well-developed and well-nourished.   HENT:   Head: Normocephalic and atraumatic.   Eyes: EOM are normal. Pupils are equal, round, and reactive to light.   Neck: Neck supple.   Cardiovascular:  Normal rate and regular rhythm.           Pulses:       Dorsalis pedis pulses are 3+ on the right side and 3+ on the left side.   Pulmonary/Chest: Breath sounds normal.   Abdominal: Abdomen is soft. Bowel sounds are normal. She exhibits no distension and no mass. There is no abdominal tenderness. There is no rebound and no guarding.   Musculoskeletal:         General: Normal range of motion.      Cervical back: Neck supple.     Neurological: She is alert and oriented to person,  place, and time. She has normal strength. GCS score is 15. GCS eye subscore is 4. GCS verbal subscore is 5. GCS motor subscore is 6.   Skin: Skin is warm and dry. Capillary refill takes less than 2 seconds.   Erythematous tender area to the left lateral hip with purulent drainage.  Wounds to bilateral great toes.   Psychiatric: She has a normal mood and affect.                   Medical Screening Exam   See Full Note    ED Course   Procedures  Labs Reviewed   BASIC METABOLIC PANEL - Abnormal       Result Value    Sodium 133 (*)     Potassium 5.0      Chloride 98      CO2 24      Anion Gap 16      Glucose 342 (*)     BUN 22 (*)     Creatinine 0.92      BUN/Creatinine Ratio 24 (*)     Calcium 10.1      eGFR 81     CBC WITH DIFFERENTIAL - Abnormal    WBC 6.32      RBC 4.39      Hemoglobin 11.9 (*)     Hematocrit 35.2 (*)     MCV 80.2      MCH 27.1      MCHC 33.8      RDW 12.1      Platelet Count 457 (*)     MPV 9.8      Neutrophils % 39.6 (*)     Lymphocytes % 43.8 (*)     Monocytes % 10.3 (*)     Eosinophils % 5.1 (*)     Basophils % 0.6      Immature Granulocytes % 0.6 (*)     nRBC, Auto 0.0      Neutrophils, Abs 2.50      Lymphocytes, Absolute 2.77      Monocytes, Absolute 0.65      Eosinophils, Absolute 0.32      Basophils, Absolute 0.04      Immature Granulocytes, Absolute 0.04      nRBC, Absolute 0.00      Diff Type Auto     POCT GLUCOSE MONITORING CONTINUOUS - Abnormal    POC Glucose 406 (*)    POCT GLUCOSE MONITORING CONTINUOUS - Abnormal    POC Glucose 295 (*)    PROTIME-INR - Normal    PT 12.6      INR 0.93     APTT - Normal    PTT 30.6     CBC W/ AUTO DIFFERENTIAL    Narrative:     The following orders were created for panel order CBC auto differential.  Procedure                               Abnormality         Status                     ---------                               -----------         ------                     CBC with Differential[[id number]]        Abnormal            Final result                  Please view results for these tests on the individual orders.          Imaging Results              X-Ray Foot Complete Bilateral (Final result)  Result time 07/02/25 13:20:57      Final result by Jayson Varghese MD (07/02/25 13:20:57)                   Impression:      As above.      Electronically signed by: Jayson Varghese  Date:    07/02/2025  Time:    13:20               Narrative:    EXAMINATION:  XR FOOT COMPLETE 3 VIEW BILATERAL    CLINICAL HISTORY:  Unspecified open wound of unspecified toe(s) without damage to nail, initial encounter    TECHNIQUE:  AP, lateral, and oblique views of both feet were performed.    COMPARISON:  Radiographs 04/16/2014    FINDINGS:  Right foot: There is soft tissue swelling in the 1st toe, with elevation of the nail.  No radiopaque foreign body.  Questionable erosions involving the terminal tuft of the 1st distal phalanx, concerning for osteomyelitis.  No fracture or dislocation.  Normal Lisfranc alignment.  Joint spaces are preserved.    Left foot: There is soft tissue swelling in the 1st toe.  No radiopaque foreign body.  No evidence of osteomyelitis.  Cortical irregularities at the bases of the 1st and 2nd metatarsals and medial cuneiform as well with asymmetric widening of the Lisfranc space concerning for Lisfranc ligament injury.  Mild scattered degenerative changes.                                       Medications   sodium chloride 0.9% bolus 1,000 mL 1,000 mL (has no administration in time range)   piperacillin-tazobactam (ZOSYN) 4.5 g in D5W 100 mL IVPB (MB+) (has no administration in time range)   sodium chloride 0.9% bolus 1,000 mL 1,000 mL (0 mLs Intravenous Stopped 7/2/25 1309)   insulin regular injection 6 Units 0.06 mL (6 Units Intravenous Given 7/2/25 1357)     Medical Decision Making  40-year-old female presents to the emergency department to be evaluated for vent wounds on her bilateral great toes in her left hip.  All of her wounds have been there for about 3  weeks.  She was evaluated in clinic loss week and started on clindamycin.  She said that she followed up today was instructed to come to the emergency department.  Labs ordered and reviewed  Xray ordered, films reviewed as well as the radiologist's interpretation significant for:  Right foot: There is soft tissue swelling in the 1st toe, with elevation of the nail.  No radiopaque foreign body.  Questionable erosions involving the terminal tuft of the 1st distal phalanx, concerning for osteomyelitis.  No fracture or dislocation.  Normal Lisfranc alignment.  Joint spaces are preserved.  Left foot: There is soft tissue swelling in the 1st toe.  No radiopaque foreign body.  No evidence of osteomyelitis.  Cortical irregularities at the bases of the 1st and 2nd metatarsals and medial cuneiform as well with asymmetric widening of the Lisfranc space concerning for Lisfranc ligament injury.  Mild scattered degenerative changes.  Diagnosis: Osteomyelitis  Treated with IV fluids, insulin and Zosyn  Case discussed with general surgery, Dr. Yao; he recommended admission to hospital medicine, he will consult  Case discussed with Dr. Burger; will admit      Amount and/or Complexity of Data Reviewed  Labs: ordered.  Radiology: ordered.    Risk  OTC drugs.                                      Clinical Impression:   Final diagnoses:  [S91.109A] Wound, open, toe  [L02.91] Abscess (Primary)                   [1]   Social History  Tobacco Use    Smoking status: Never    Smokeless tobacco: Never   Substance Use Topics    Alcohol use: Never    Drug use: Never        Yanet Camargo, [facility]  07/02/25 9054

## 2025-07-02 NOTE — CONSULTS
[facility] - Emergency Department  General Surgery  Consult Note    Patient Name: Elieser Hendricks  MRN: [id number]  Code Status: No Order  Admission Date: 7/2/2025  Hospital Length of Stay: 0 days  Attending Physician: Anitha Burger DO  Primary Care Provider: No primary care provider on file.    Patient information was obtained from patient and ER records.     Consults  Subjective:     Principal Problem: <principal problem not specified>    History of Present Illness: 40-year-old  female presents to the ER with complaints of bilateral wounds to great toes and left hip.  She has had these for about 3 weeks; been on some oral antibiotics; states the areas were getting worse.  X-ray of the right foot showed erosions involving the terminal tuft of the 1st distal phalanx, concerning for osteomyelitis; left soft tissue swelling of the 1st toe, no evidence of osteomyelitis; cortical irregularities at the bases of the 1st and 2nd metatarsals with the asymmetric widening of the Lisfranc space concerning for a Lisfranc ligament injury.  Denies any chest pain/shortness of breath, nausea/vomiting/diarrhea, fever/chills.  Past medical history of diabetes        No current facility-administered medications on file prior to encounter.     Current Outpatient Medications on File Prior to Encounter   Medication Sig    clindamycin (CLEOCIN) 300 MG capsule Take 300 mg by mouth every 12 (twelve) hours.    dextroamphetamine-amphetamine (ADDERALL XR) 30 MG 24 hr capsule Take 30 mg by mouth every morning.    metFORMIN (GLUCOPHAGE) 500 MG tablet Take 1,000 mg by mouth 2 (two) times daily with meals.    NOVOLOG MIX 70-30 U-100 INSULN 100 unit/mL (70-30) Soln INJECT 10 UNITS UNDER THE SKIN IN THE MORNING & 14 UNITS AT NIGHT EVERY DAY (TWICE A DAY)    temazepam (RESTORIL) 30 mg capsule Take 30 mg by mouth nightly as needed.    [DISCONTINUED] amoxicillin-clavulanate 875-125mg (AUGMENTIN) 875-125 mg per tablet Take 1  tablet by mouth 2 (two) times daily.       Review of patient's allergies indicates:  No Known Allergies    Past Medical History:   Diagnosis Date    Diabetes mellitus      History reviewed. No pertinent surgical history.  Family History    None       Tobacco Use    Smoking status: Never    Smokeless tobacco: Never   Substance and Sexual Activity    Alcohol use: Never    Drug use: Never    Sexual activity: Not on file     Review of Systems   Constitutional: Negative.  Negative for fatigue and unexpected weight change.   HENT: Negative.  Negative for trouble swallowing.    Eyes: Negative.    Respiratory: Negative.  Negative for chest tightness and shortness of breath.    Cardiovascular: Negative.  Negative for chest pain.   Gastrointestinal: Negative.  Negative for abdominal pain, blood in stool and nausea.   Endocrine: Negative.    Genitourinary: Negative.  Negative for hematuria.   Musculoskeletal: Negative.  Negative for back pain and myalgias.   Neurological: Negative.  Negative for dizziness, speech difficulty, weakness and light-headedness.   Psychiatric/Behavioral: Negative.  Negative for agitation and behavioral problems.      Objective:     Vital Signs (Most Recent):  Temp: 98 °F (36.7 °C) (07/02/25 1307)  Pulse: 85 (07/02/25 1307)  Resp: 16 (07/02/25 1120)  BP: 112/69 (07/02/25 1307)  SpO2: 95 % (07/02/25 1307) Vital Signs (24h Range):  Temp:  [98 °F (36.7 °C)-98.1 °F (36.7 °C)] 98 °F (36.7 °C)  Pulse:  [85-98] 85  Resp:  [16] 16  SpO2:  [95 %-97 %] 95 %  BP: (112-128)/(69-89) 112/69     Weight: 86.2 kg (190 lb)  Body mass index is 24.39 kg/m².     Physical Exam  Constitutional:       General: She is not in acute distress.     Appearance: Normal appearance.   HENT:      Head: Normocephalic.   Cardiovascular:      Rate and Rhythm: Normal rate.   Pulmonary:      Effort: Pulmonary effort is normal. No respiratory distress.   Abdominal:      General: There is no distension.      Tenderness: There is no abdominal  tenderness.   Musculoskeletal:         General: Normal range of motion.        Legs:         Feet:       Comments: Left hip ulceration with epidermal necrosis   Feet:      Comments: Bilateral great toe wounds; bone exposed on the right; toenail coming off in the left with inflammation  Skin:     General: Skin is warm.      Coloration: Skin is not jaundiced.   Neurological:      General: No focal deficit present.      Mental Status: She is alert and oriented to person, place, and time.      Cranial Nerves: No cranial nerve deficit.                      I have reviewed all pertinent lab results within the past 24 hours.  CBC:   Recent Labs   Lab 07/02/25  1158   WBC 6.32   RBC 4.39   HGB 11.9*   HCT 35.2*   *   MCV 80.2   MCH 27.1   MCHC 33.8     BMP:   Recent Labs   Lab 07/02/25  1158   *   *   K 5.0   CL 98   CO2 24   BUN 22*   CREATININE 0.92   CALCIUM 10.1       Significant Diagnostics:  I have reviewed all pertinent imaging results/findings within the past 24 hours.    Assessment/Plan:     Decubitus ulcer of left hip, stage 2  To the OR for debridement of left hip, bilateral great toes.      Risks and benefits explained with the patient including risks for infection, bleeding, injury to surrounding structures, hematoma/seroma formation with need for possible evacuation, possible open.  The patient verbalized understanding, agrees and wishes to proceed with surgery.    Admitted to hospitalist for medical management      VTE Risk Mitigation (From admission, onward)      None            Thank you for your consult. I will follow-up with patient. Please contact us if you have any additional questions.    Juan Francisco Hallman, DO  General Surgery  [facility] - Emergency Department

## 2025-07-02 NOTE — ASSESSMENT & PLAN NOTE
To the OR for debridement of left hip, bilateral great toes.      Risks and benefits explained with the patient including risks for infection, bleeding, injury to surrounding structures, hematoma/seroma formation with need for possible evacuation, possible open.  The patient verbalized understanding, agrees and wishes to proceed with surgery.    Admitted to hospitalist for medical management

## 2025-07-02 NOTE — PROGRESS NOTES
1820 RELEASED TO FLOOR RN AWAKE, ALERT. V/S 122/72-88-16-98.9 TEMP. 98% O2 SAT. FAMILY AT BEDSIDE.

## 2025-07-02 NOTE — ANESTHESIA PREPROCEDURE EVALUATION
07/02/2025  Elieser Hendricks is a 40 y.o., female.      Pre-op Assessment    I have reviewed the Patient Summary Reports.     I have reviewed the Nursing Notes. I have reviewed the NPO Status.   I have reviewed the Medications.     Review of Systems  Anesthesia Hx:  No problems with previous Anesthesia                Social:  Non-Smoker, No Alcohol Use       Hematology/Oncology:  Hematology Normal   Oncology Normal                                   EENT/Dental:  EENT/Dental Normal           Cardiovascular:               PVD                                Pulmonary:  Pulmonary Normal                       Renal/:  Renal/ Normal                 Hepatic/GI:  Hepatic/GI Normal                    Musculoskeletal:  Musculoskeletal Normal    Diabetic ulcers bilat great toes / hip            Neurological:  Neurology Normal                                      Endocrine:  Diabetes, poorly controlled, type 2           Dermatological:  Skin Normal    Psych:  Psychiatric Normal                    Physical Exam  General: Well nourished    Airway:  Mallampati: II / II  Mouth Opening: Normal  TM Distance: > 6 cm  Tongue: Normal  Neck ROM: Normal ROM    Chest/Lungs:  Clear to auscultation, Normal Respiratory Rate    Heart:  Rate: Normal  Rhythm: Regular Rhythm        Anesthesia Plan  Type of Anesthesia, risks & benefits discussed:    Anesthesia Type: Gen Supraglottic Airway  Intra-op Monitoring Plan: Standard ASA Monitors  Post Op Pain Control Plan: multimodal analgesia  Induction:  IV  Informed Consent: Informed consent signed with the Patient and all parties understand the risks and agree with anesthesia plan.  All questions answered. Patient consented to blood products? Yes  ASA Score: 3 Emergent  Day of Surgery Review of History & Physical: H&P Update referred to the surgeon/provider.I have interviewed and examined  the patient. I have reviewed the patient's H&P dated:     Ready For Surgery From Anesthesia Perspective.     .      Chemistry        Component Value Date/Time     (L) 07/02/2025 1158    K 5.0 07/02/2025 1158    CL 98 07/02/2025 1158    CO2 24 07/02/2025 1158    BUN 22 (H) 07/02/2025 1158    CREATININE 0.92 07/02/2025 1158     (H) 07/02/2025 1158        Component Value Date/Time    CALCIUM 10.1 07/02/2025 1158    ALKPHOS 127 (H) 10/02/2020 1806    AST 11 (L) 10/02/2020 1806    ALT 19 10/02/2020 1806    BILITOT 0.5 10/02/2020 1806    ESTGFRAFRICA 78 10/02/2020 1806    EGFRNONAA 64 10/02/2020 1806        Lab Results   Component Value Date    WBC 6.32 07/02/2025    HGB 11.9 (L) 07/02/2025    HCT 35.2 (L) 07/02/2025     (H) 07/02/2025     No results found for this or any previous visit.  No results found for this or any previous visit.

## 2025-07-02 NOTE — HPI
40-year-old  female presents to the ER with complaints of bilateral wounds to great toes and left hip.  She has had these for about 3 weeks; been on some oral antibiotics; states the areas were getting worse.  X-ray of the right foot showed erosions involving the terminal tuft of the 1st distal phalanx, concerning for osteomyelitis; left soft tissue swelling of the 1st toe, no evidence of osteomyelitis; cortical irregularities at the bases of the 1st and 2nd metatarsals with the asymmetric widening of the Lisfranc space concerning for a Lisfranc ligament injury.  Denies any chest pain/shortness of breath, nausea/vomiting/diarrhea, fever/chills.  Past medical history of diabetes

## 2025-07-02 NOTE — SUBJECTIVE & OBJECTIVE
No current facility-administered medications on file prior to encounter.     Current Outpatient Medications on File Prior to Encounter   Medication Sig    clindamycin (CLEOCIN) 300 MG capsule Take 300 mg by mouth every 12 (twelve) hours.    dextroamphetamine-amphetamine (ADDERALL XR) 30 MG 24 hr capsule Take 30 mg by mouth every morning.    metFORMIN (GLUCOPHAGE) 500 MG tablet Take 1,000 mg by mouth 2 (two) times daily with meals.    NOVOLOG MIX 70-30 U-100 INSULN 100 unit/mL (70-30) Soln INJECT 10 UNITS UNDER THE SKIN IN THE MORNING & 14 UNITS AT NIGHT EVERY DAY (TWICE A DAY)    temazepam (RESTORIL) 30 mg capsule Take 30 mg by mouth nightly as needed.    [DISCONTINUED] amoxicillin-clavulanate 875-125mg (AUGMENTIN) 875-125 mg per tablet Take 1 tablet by mouth 2 (two) times daily.       Review of patient's allergies indicates:  No Known Allergies    Past Medical History:   Diagnosis Date    Diabetes mellitus      History reviewed. No pertinent surgical history.  Family History    None       Tobacco Use    Smoking status: Never    Smokeless tobacco: Never   Substance and Sexual Activity    Alcohol use: Never    Drug use: Never    Sexual activity: Not on file     Review of Systems   Constitutional: Negative.  Negative for fatigue and unexpected weight change.   HENT: Negative.  Negative for trouble swallowing.    Eyes: Negative.    Respiratory: Negative.  Negative for chest tightness and shortness of breath.    Cardiovascular: Negative.  Negative for chest pain.   Gastrointestinal: Negative.  Negative for abdominal pain, blood in stool and nausea.   Endocrine: Negative.    Genitourinary: Negative.  Negative for hematuria.   Musculoskeletal: Negative.  Negative for back pain and myalgias.   Neurological: Negative.  Negative for dizziness, speech difficulty, weakness and light-headedness.   Psychiatric/Behavioral: Negative.  Negative for agitation and behavioral problems.      Objective:     Vital Signs (Most  Recent):  Temp: 98 °F (36.7 °C) (07/02/25 1307)  Pulse: 85 (07/02/25 1307)  Resp: 16 (07/02/25 1120)  BP: 112/69 (07/02/25 1307)  SpO2: 95 % (07/02/25 1307) Vital Signs (24h Range):  Temp:  [98 °F (36.7 °C)-98.1 °F (36.7 °C)] 98 °F (36.7 °C)  Pulse:  [85-98] 85  Resp:  [16] 16  SpO2:  [95 %-97 %] 95 %  BP: (112-128)/(69-89) 112/69     Weight: 86.2 kg (190 lb)  Body mass index is 24.39 kg/m².     Physical Exam  Constitutional:       General: She is not in acute distress.     Appearance: Normal appearance.   HENT:      Head: Normocephalic.   Cardiovascular:      Rate and Rhythm: Normal rate.   Pulmonary:      Effort: Pulmonary effort is normal. No respiratory distress.   Abdominal:      General: There is no distension.      Tenderness: There is no abdominal tenderness.   Musculoskeletal:         General: Normal range of motion.        Legs:         Feet:       Comments: Left hip ulceration with epidermal necrosis   Feet:      Comments: Bilateral great toe wounds; bone exposed on the right; toenail coming off in the left with inflammation  Skin:     General: Skin is warm.      Coloration: Skin is not jaundiced.   Neurological:      General: No focal deficit present.      Mental Status: She is alert and oriented to person, place, and time.      Cranial Nerves: No cranial nerve deficit.                      I have reviewed all pertinent lab results within the past 24 hours.  CBC:   Recent Labs   Lab 07/02/25  1158   WBC 6.32   RBC 4.39   HGB 11.9*   HCT 35.2*   *   MCV 80.2   MCH 27.1   MCHC 33.8     BMP:   Recent Labs   Lab 07/02/25  1158   *   *   K 5.0   CL 98   CO2 24   BUN 22*   CREATININE 0.92   CALCIUM 10.1       Significant Diagnostics:  I have reviewed all pertinent imaging results/findings within the past 24 hours.

## 2025-07-02 NOTE — TRANSFER OF CARE
"Anesthesia Transfer of Care Note    Patient: Elieser Hendricks    Procedure(s) Performed: Procedure(s) (LRB):  DEBRIDEMENT, FOOT (Bilateral)  DEBRIDEMENT, WOUND (Left)    Patient location: PACU    Anesthesia Type: general    Transport from OR: Transported from OR on 6-10 L/min O2 by face mask with adequate spontaneous ventilation    Post pain: adequate analgesia    Post assessment: tolerated procedure well and no apparent anesthetic complications    Post vital signs: stable    Level of consciousness: responds to stimulation    Nausea/Vomiting: no nausea/vomiting    Complications: none    Transfer of care protocol was followed      Last vitals: Visit Vitals  BP (!) 112/52   Pulse 96   Temp 36.8 °C (98.3 °F)   Resp 10   Ht 6' 2" (1.88 m)   Wt 86.2 kg (190 lb)   SpO2 100%   Breastfeeding No   BMI 24.39 kg/m²     "

## 2025-07-02 NOTE — OP NOTE
[facility]  Surgery Department  Operative Note    SUMMARY     Date of Procedure: 7/2/2025     Procedure: Procedure(s) (LRB):  DEBRIDEMENT, FOOT (Bilateral)  DEBRIDEMENT, WOUND (Left)     Surgeons and Role:     * Juan Francisco Yao,  - Primary    Assisting Surgeon: None    Pre-Operative Diagnosis: Wound, open, toe [S91.109A]  Open wound of left hip, initial encounter [S71.002A]    Post-Operative Diagnosis: Post-Op Diagnosis Codes:     * Wound, open, toe [S91.109A]     * Open wound of left hip, initial encounter [S71.002A]    Anesthesia: General/MAC    Operative Findings (including complications, if any):  Bilateral great toe wounds with toenails partially on attached; no bone exposure on bilateral great toes.  Purulent drainage with epidermal and fat necrosis to left hip; cultures sent to microbiology    Description of Technical Procedures:  Patient was taken the operating room and placed on the operating table in the right lateral decubitus position.  The left hip, bilateral great toes were prepped and draped in usual sterile fashion.  We 1st turned our attention to the left great toe.  The toenail was partially attached; we removed the toenail and cleaned the nail bed and debrided with sharp excisional debridement with a curette down to healthy tissue and muscle; wound measured a proximally 2 x 2 x 0.2 cm.  We irrigated the cavity and suctioned clear, bleeding controlled electrocautery.  We then debrided the right toe; the great toe toenail was partially attached; we removed the entire toenail and debrided the nail bed with sharp excisional debridement with a curette down to healthy muscle; no bone exposure.  We irrigated the cavity and suctioned clear, bleeding controlled electrocautery.  We then placed Adaptic dressing over both toes, 4 x 4 gauze and Kerlix.  We then turned our attention to the left hip.  We excised an area of necrotic skin and used sharp excisional debridement with a  electrocautery all way down to healthy subcutaneous tissue; the wound measured 4 x 3 x 2 cm.  There was purulent drainage we cultured and sent to microbiology.  We then irrigated the cavity and suctioned clear, bleeding controlled electrocautery.  Wet-to-dry sterile dressing placed with gauze and tape.  Patient was awakened, extubated and taken the PACU in stable condition.  Patient tolerated procedure well.        Estimated Blood Loss (EBL):10cc           Implants: * No implants in log *    Specimens:   Specimen (24h ago, onward)       Start     Ordered    07/02/25 1641  Surgical Pathology  RELEASE UPON ORDERING         07/02/25 1641                   ID Type Source Tests Collected by Time Destination   1 : necrotic tissue Tissue Hip, Left SURGICAL PATHOLOGY Juan Francisco Yao, DO 7/2/2025 1641               Condition: Good    Disposition: PACU - hemodynamically stable.    Attestation: Op Note Attestation: I was physically present and scrubbed for the entire procedure.

## 2025-07-02 NOTE — H&P
[facility] Hospital Medicine  History & Physical    Patient Name: Elieser Hendricks  MRN: [id number]  Patient Class: OP- Outpatient Recovery  Admission Date: 7/2/2025  Attending Physician: Anitha Burger DO   Primary Care Provider: No primary care provider on file.         Patient information was obtained from patient and ER records.     Subjective:     Principal Problem:Osteomyelitis    Chief Complaint:   Chief Complaint   Patient presents with    Wound Check     Patient here for apparent abscess to left hip and may need I&D. Patient c/o ulcers to bilateral great toes         HPI: Mrs. Hendricks is a 41 yo female with pmh of t2dm, htn, adhd, anxiety/depression, and ptsd. Presented to the ED with complaints of worsening pain, redness, and swelling to bilateral great toes along with left hip wound.  Symptoms started approximately 3 weeks ago and have gradually worsened. Denies injury.  She reports that she's been following with Nurse Herman at [facility]. She's been taking clindamycin without any improvement.  She endorses nausea/vomiting and abdominal discomfort. Denies fever/chills, chest pain, shortness of breath, diarrhea, constipation.      Upon arrival to the ED, vital signs were stable. WBC normal.  Bilateral foot xray reveals soft tissue swelling in right great toe with questionable osteomyelitis of distal phalanx.  General surgery was consulted while patient was in ED.  She's being admitted to hospitalist service for further evaluation and treatment.     Past Medical History:   Diagnosis Date    Diabetes mellitus        History reviewed. No pertinent surgical history.    Review of patient's allergies indicates:  No Known Allergies    No current facility-administered medications on file prior to encounter.     Current Outpatient Medications on File Prior to Encounter   Medication Sig    clindamycin (CLEOCIN) 300 MG capsule Take 300 mg by mouth every 12 (twelve) hours.     dextroamphetamine-amphetamine (ADDERALL XR) 30 MG 24 hr capsule Take 30 mg by mouth every morning.    metFORMIN (GLUCOPHAGE) 500 MG tablet Take 1,000 mg by mouth 2 (two) times daily with meals.    NOVOLOG MIX 70-30 U-100 INSULN 100 unit/mL (70-30) Soln INJECT 10 UNITS UNDER THE SKIN IN THE MORNING & 14 UNITS AT NIGHT EVERY DAY (TWICE A DAY)    temazepam (RESTORIL) 30 mg capsule Take 30 mg by mouth nightly as needed.    [DISCONTINUED] amoxicillin-clavulanate 875-125mg (AUGMENTIN) 875-125 mg per tablet Take 1 tablet by mouth 2 (two) times daily.     Family History    None       Tobacco Use    Smoking status: Never    Smokeless tobacco: Never   Substance and Sexual Activity    Alcohol use: Never    Drug use: Never    Sexual activity: Not on file     Review of Systems   Constitutional:  Negative for chills, fatigue, fever and unexpected weight change.   HENT:  Negative for congestion, mouth sores and sore throat.    Eyes:  Negative for photophobia and visual disturbance.   Respiratory:  Negative for cough, chest tightness, shortness of breath and wheezing.    Cardiovascular:  Negative for chest pain, palpitations and leg swelling.   Endocrine: Negative for cold intolerance and heat intolerance.   Genitourinary:  Negative for difficulty urinating, dysuria, frequency and urgency.   Musculoskeletal:  Negative for back pain and myalgias.   Skin:  Negative for pallor and rash.   Neurological:  Negative for tremors, seizures, syncope, weakness, numbness and headaches.   Hematological:  Does not bruise/bleed easily.   Psychiatric/Behavioral:  Negative for agitation, confusion, hallucinations and suicidal ideas.      Objective:     Vital Signs (Most Recent):  Temp: 98 °F (36.7 °C) (07/02/25 1307)  Pulse: 85 (07/02/25 1307)  Resp: 16 (07/02/25 1120)  BP: 112/69 (07/02/25 1307)  SpO2: 95 % (07/02/25 1307) Vital Signs (24h Range):  Temp:  [98 °F (36.7 °C)-98.1 °F (36.7 °C)] 98 °F (36.7 °C)  Pulse:  [85-98] 85  Resp:  [16]  "16  SpO2:  [95 %-97 %] 95 %  BP: (112-128)/(69-89) 112/69     Weight: 86.2 kg (190 lb)  Body mass index is 24.39 kg/m².     Physical Exam  Constitutional:       Appearance: Normal appearance.   HENT:      Head: Normocephalic and atraumatic.      Nose: Nose normal.   Eyes:      Extraocular Movements: Extraocular movements intact.      Pupils: Pupils are equal, round, and reactive to light.   Cardiovascular:      Rate and Rhythm: Normal rate and regular rhythm.   Pulmonary:      Effort: Pulmonary effort is normal.      Breath sounds: Normal breath sounds.   Abdominal:      General: Bowel sounds are normal.      Palpations: Abdomen is soft.   Musculoskeletal:      Comments: Ulceration noted to left hip with surrounding erythema   Skin:     General: Skin is warm and dry.      Comments: Ulcerations noted to dorsal aspect of bilateral great toes with surrounding erythema and edema   Neurological:      General: No focal deficit present.      Mental Status: She is alert and oriented to person, place, and time.   Psychiatric:         Mood and Affect: Mood normal.         Behavior: Behavior normal.              CRANIAL NERVES     CN III, IV, VI   Pupils are equal, round, and reactive to light.       Significant Labs: All pertinent labs within the past 24 hours have been reviewed.    Significant Imaging: I have reviewed all pertinent imaging results/findings within the past 24 hours.  Assessment/Plan:     Assessment & Plan  Osteomyelitis  Continue with zosyn/vancomycin IV.  Surgery consulted.  NPO after midnight.    Diabetic ulcer of left great toe  Patient's FSGs are uncontrolled due to hyperglycemia on current medication regimen.  Last A1c reviewed- No results found for: "LABA1C", "HGBA1C"  Most recent fingerstick glucose reviewed- No results for input(s): "POCTGLUCOSE" in the last 24 hours.  Current correctional scale  Medium  Maintain anti-hyperglycemic dose as follows-   Antihyperglycemics (From admission, onward)      " "Start     Stop Route Frequency Ordered    07/03/25 0715  insulin asp prt-insulin aspart (NovoLOG 70/30) injection 10 Units         -- SubQ with breakfast 07/02/25 1553    07/02/25 1645  insulin aspart U-100 injection 14 Units         -- SubQ with dinner 07/02/25 1553          Hold Oral hypoglycemics while patient is in the hospital.    Continue with zosyn/vancomycin IV.  Surgery consulted.  NPO after midnight.  Diabetic ulcer of right great toe  Patient's FSGs are uncontrolled due to hyperglycemia on current medication regimen.  Last A1c reviewed- No results found for: "LABA1C", "HGBA1C"  Most recent fingerstick glucose reviewed- No results for input(s): "POCTGLUCOSE" in the last 24 hours.  Current correctional scale  Medium  Maintain anti-hyperglycemic dose as follows-   Antihyperglycemics (From admission, onward)      Start     Stop Route Frequency Ordered    07/03/25 0715  insulin asp prt-insulin aspart (NovoLOG 70/30) injection 10 Units         -- SubQ with breakfast 07/02/25 1553    07/02/25 1645  insulin aspart U-100 injection 14 Units         -- SubQ with dinner 07/02/25 1553          Hold Oral hypoglycemics while patient is in the hospital.    Continue with zosyn/vancomycin IV.  Surgery consulted.  NPO after midnight.  Decubitus ulcer of left hip, stage 2  Continue with zosyn/vancomycin IV.  Surgery consulted.  NPO after midnight.      VTE Risk Mitigation (From admission, onward)      None                                        Anitha Burger DO  Department of Hospital Medicine  [facility]          "

## 2025-07-02 NOTE — NURSING
Patient arrived to the floor at 1830 dressing on hip and bilateral great toes clean dry and intact. Patient transferred to bed resting comfortably respiration unlabored family member at bedside.

## 2025-07-02 NOTE — SUBJECTIVE & OBJECTIVE
Past Medical History:   Diagnosis Date    Diabetes mellitus        History reviewed. No pertinent surgical history.    Review of patient's allergies indicates:  No Known Allergies    No current facility-administered medications on file prior to encounter.     Current Outpatient Medications on File Prior to Encounter   Medication Sig    clindamycin (CLEOCIN) 300 MG capsule Take 300 mg by mouth every 12 (twelve) hours.    dextroamphetamine-amphetamine (ADDERALL XR) 30 MG 24 hr capsule Take 30 mg by mouth every morning.    metFORMIN (GLUCOPHAGE) 500 MG tablet Take 1,000 mg by mouth 2 (two) times daily with meals.    NOVOLOG MIX 70-30 U-100 INSULN 100 unit/mL (70-30) Soln INJECT 10 UNITS UNDER THE SKIN IN THE MORNING & 14 UNITS AT NIGHT EVERY DAY (TWICE A DAY)    temazepam (RESTORIL) 30 mg capsule Take 30 mg by mouth nightly as needed.    [DISCONTINUED] amoxicillin-clavulanate 875-125mg (AUGMENTIN) 875-125 mg per tablet Take 1 tablet by mouth 2 (two) times daily.     Family History    None       Tobacco Use    Smoking status: Never    Smokeless tobacco: Never   Substance and Sexual Activity    Alcohol use: Never    Drug use: Never    Sexual activity: Not on file     Review of Systems   Constitutional:  Negative for chills, fatigue, fever and unexpected weight change.   HENT:  Negative for congestion, mouth sores and sore throat.    Eyes:  Negative for photophobia and visual disturbance.   Respiratory:  Negative for cough, chest tightness, shortness of breath and wheezing.    Cardiovascular:  Negative for chest pain, palpitations and leg swelling.   Endocrine: Negative for cold intolerance and heat intolerance.   Genitourinary:  Negative for difficulty urinating, dysuria, frequency and urgency.   Musculoskeletal:  Negative for back pain and myalgias.   Skin:  Negative for pallor and rash.   Neurological:  Negative for tremors, seizures, syncope, weakness, numbness and headaches.   Hematological:  Does not bruise/bleed  easily.   Psychiatric/Behavioral:  Negative for agitation, confusion, hallucinations and suicidal ideas.      Objective:     Vital Signs (Most Recent):  Temp: 98 °F (36.7 °C) (07/02/25 1307)  Pulse: 85 (07/02/25 1307)  Resp: 16 (07/02/25 1120)  BP: 112/69 (07/02/25 1307)  SpO2: 95 % (07/02/25 1307) Vital Signs (24h Range):  Temp:  [98 °F (36.7 °C)-98.1 °F (36.7 °C)] 98 °F (36.7 °C)  Pulse:  [85-98] 85  Resp:  [16] 16  SpO2:  [95 %-97 %] 95 %  BP: (112-128)/(69-89) 112/69     Weight: 86.2 kg (190 lb)  Body mass index is 24.39 kg/m².     Physical Exam  Constitutional:       Appearance: Normal appearance.   HENT:      Head: Normocephalic and atraumatic.      Nose: Nose normal.   Eyes:      Extraocular Movements: Extraocular movements intact.      Pupils: Pupils are equal, round, and reactive to light.   Cardiovascular:      Rate and Rhythm: Normal rate and regular rhythm.   Pulmonary:      Effort: Pulmonary effort is normal.      Breath sounds: Normal breath sounds.   Abdominal:      General: Bowel sounds are normal.      Palpations: Abdomen is soft.   Musculoskeletal:      Comments: Ulceration noted to left hip with surrounding erythema   Skin:     General: Skin is warm and dry.      Comments: Ulcerations noted to dorsal aspect of bilateral great toes with surrounding erythema and edema   Neurological:      General: No focal deficit present.      Mental Status: She is alert and oriented to person, place, and time.   Psychiatric:         Mood and Affect: Mood normal.         Behavior: Behavior normal.              CRANIAL NERVES     CN III, IV, VI   Pupils are equal, round, and reactive to light.       Significant Labs: All pertinent labs within the past 24 hours have been reviewed.    Significant Imaging: I have reviewed all pertinent imaging results/findings within the past 24 hours.

## 2025-07-02 NOTE — PROGRESS NOTES
1655 RECEIVED TO RR SEDATED ORAL AIRWAY IN PLACE. O2 VIA FM. RESP. UNLABORED. POSITION ON RIGHT SIDE. DRESSING LEFT HIP AND PABLO GREAT TOES D/I. IV INFUSING WELL 20G. CATH. BLOOD SUGAR 247. OBSERVING CLOSELY. SEE FLOW SHEET.    1710 ORAL AIRWAY REMOVED, ORIENTATION GIVEN. FOLLOWS COMMANDS, RUDI. C/O PAIN AT OP-SITES. DILAUDID TITRATED FOR RELIEF.    1739 OFIRMEV GIVEN FOR PAIN RELIEF. O2 SATS LOW ON ROOM AIR. ENCOURAGED COUGH AND DEEP BREATHS.    1800 DOZING AT INTERVALS. DRESSINGS D/I. TRANSFERRED TO ROOM.

## 2025-07-02 NOTE — PHARMACY MED REC
"Admission Medication History     The home medication history was taken by Raquel Joiner.    You may go to "Admission" then "Reconcile Home Medications" tabs to review and/or act upon these items.     The home medication list has been updated by the Pharmacy department.   Please read ALL comments highlighted in yellow.   Please address this information as you see fit.    Feel free to contact us if you have any questions or require assistance.    The following medications were added:  Clindamycin 300 mg  Adderall XR 30 mg  Novolog 70-30 U-100  Temazepam 30 mg      The medications listed below were removed from the home medication list. Please reorder if appropriate:  Patient reports no longer taking the following medication(s):  Augmentin 875-125 mg        Medications listed below were obtained from: Patient/family, Analytic software- [employer], and Medical records  PTA Medications   Medication Sig    clindamycin (CLEOCIN) 300 MG capsule Take 300 mg by mouth every 12 (twelve) hours.    dextroamphetamine-amphetamine (ADDERALL XR) 30 MG 24 hr capsule Take 30 mg by mouth every morning.    metFORMIN (GLUCOPHAGE) 500 MG tablet Take 1,000 mg by mouth 2 (two) times daily with meals.    NOVOLOG MIX 70-30 U-100 INSULN 100 unit/mL (70-30) Soln INJECT 10 UNITS UNDER THE SKIN IN THE MORNING & 14 UNITS AT NIGHT EVERY DAY (TWICE A DAY)    temazepam (RESTORIL) 30 mg capsule Take 30 mg by mouth nightly as needed.         Current Outpatient Medications on File Prior to Encounter   Medication Sig Dispense Refill Last Dose/Taking    clindamycin (CLEOCIN) 300 MG capsule Take 300 mg by mouth every 12 (twelve) hours.   7/2/2025    dextroamphetamine-amphetamine (ADDERALL XR) 30 MG 24 hr capsule Take 30 mg by mouth every morning.   7/2/2025    metFORMIN (GLUCOPHAGE) 500 MG tablet Take 1,000 mg by mouth 2 (two) times daily with meals.   7/2/2025    NOVOLOG MIX 70-30 U-100 INSULN 100 unit/mL (70-30) Soln INJECT 10 UNITS UNDER THE SKIN IN THE " MORNING & 14 UNITS AT NIGHT EVERY DAY (TWICE A DAY)   7/2/2025    temazepam (RESTORIL) 30 mg capsule Take 30 mg by mouth nightly as needed.   7/1/2025    [DISCONTINUED] amoxicillin-clavulanate 875-125mg (AUGMENTIN) 875-125 mg per tablet Take 1 tablet by mouth 2 (two) times daily. 20 tablet 0        Potential issues to be addressed PRIOR TO DISCHARGE  N/A    Raquel Joiner  EXT 1021                 .

## 2025-07-03 LAB
ANION GAP SERPL CALCULATED.3IONS-SCNC: 10 MMOL/L (ref 7–16)
BASOPHILS # BLD AUTO: 0.03 K/UL (ref 0–0.2)
BASOPHILS NFR BLD AUTO: 0.6 % (ref 0–1)
BUN SERPL-MCNC: 14 MG/DL (ref 7–19)
BUN/CREAT SERPL: 18 (ref 6–20)
CALCIUM SERPL-MCNC: 8.9 MG/DL (ref 8.4–10.2)
CHLORIDE SERPL-SCNC: 105 MMOL/L (ref 98–107)
CO2 SERPL-SCNC: 25 MMOL/L (ref 22–29)
CREAT SERPL-MCNC: 0.76 MG/DL (ref 0.55–1.02)
DIFFERENTIAL METHOD BLD: ABNORMAL
EGFR (NO RACE VARIABLE) (RUSH/TITUS): 102 ML/MIN/1.73M2
EOSINOPHIL # BLD AUTO: 0.26 K/UL (ref 0–0.5)
EOSINOPHIL NFR BLD AUTO: 5.2 % (ref 1–4)
ERYTHROCYTE [DISTWIDTH] IN BLOOD BY AUTOMATED COUNT: 12.3 % (ref 11.5–14.5)
GLUCOSE SERPL-MCNC: 206 MG/DL (ref 70–105)
GLUCOSE SERPL-MCNC: 206 MG/DL (ref 70–105)
GLUCOSE SERPL-MCNC: 234 MG/DL (ref 70–105)
GLUCOSE SERPL-MCNC: 241 MG/DL (ref 70–105)
GLUCOSE SERPL-MCNC: 245 MG/DL (ref 74–100)
GLUCOSE SERPL-MCNC: 265 MG/DL (ref 70–105)
HCT VFR BLD AUTO: 30.7 % (ref 38–47)
HGB BLD-MCNC: 10.4 G/DL (ref 12–16)
IMM GRANULOCYTES # BLD AUTO: 0.01 K/UL (ref 0–0.04)
IMM GRANULOCYTES NFR BLD: 0.2 % (ref 0–0.4)
LYMPHOCYTES # BLD AUTO: 2.59 K/UL (ref 1–4.8)
LYMPHOCYTES NFR BLD AUTO: 51.6 % (ref 27–41)
MCH RBC QN AUTO: 27.6 PG (ref 27–31)
MCHC RBC AUTO-ENTMCNC: 33.9 G/DL (ref 32–36)
MCV RBC AUTO: 81.4 FL (ref 80–96)
MONOCYTES # BLD AUTO: 0.4 K/UL (ref 0–0.8)
MONOCYTES NFR BLD AUTO: 8 % (ref 2–6)
MPC BLD CALC-MCNC: 9.6 FL (ref 9.4–12.4)
NEUTROPHILS # BLD AUTO: 1.73 K/UL (ref 1.8–7.7)
NEUTROPHILS NFR BLD AUTO: 34.4 % (ref 53–65)
NRBC # BLD AUTO: 0 X10E3/UL
NRBC, AUTO (.00): 0 %
PLATELET # BLD AUTO: 392 K/UL (ref 150–400)
POTASSIUM SERPL-SCNC: 4.4 MMOL/L (ref 3.5–5.1)
RBC # BLD AUTO: 3.77 M/UL (ref 4.2–5.4)
SODIUM SERPL-SCNC: 136 MMOL/L (ref 136–145)
WBC # BLD AUTO: 5.02 K/UL (ref 4.5–11)

## 2025-07-03 PROCEDURE — 63600175 PHARM REV CODE 636 W HCPCS

## 2025-07-03 PROCEDURE — 82962 GLUCOSE BLOOD TEST: CPT

## 2025-07-03 PROCEDURE — 80048 BASIC METABOLIC PNL TOTAL CA: CPT | Performed by: STUDENT IN AN ORGANIZED HEALTH CARE EDUCATION/TRAINING PROGRAM

## 2025-07-03 PROCEDURE — 63600175 PHARM REV CODE 636 W HCPCS: Performed by: FAMILY MEDICINE

## 2025-07-03 PROCEDURE — 63600175 PHARM REV CODE 636 W HCPCS: Performed by: STUDENT IN AN ORGANIZED HEALTH CARE EDUCATION/TRAINING PROGRAM

## 2025-07-03 PROCEDURE — 85025 COMPLETE CBC W/AUTO DIFF WBC: CPT | Performed by: STUDENT IN AN ORGANIZED HEALTH CARE EDUCATION/TRAINING PROGRAM

## 2025-07-03 PROCEDURE — 96372 THER/PROPH/DIAG INJ SC/IM: CPT

## 2025-07-03 PROCEDURE — 36415 COLL VENOUS BLD VENIPUNCTURE: CPT | Performed by: STUDENT IN AN ORGANIZED HEALTH CARE EDUCATION/TRAINING PROGRAM

## 2025-07-03 PROCEDURE — 25000003 PHARM REV CODE 250: Performed by: STUDENT IN AN ORGANIZED HEALTH CARE EDUCATION/TRAINING PROGRAM

## 2025-07-03 PROCEDURE — 99233 SBSQ HOSP IP/OBS HIGH 50: CPT | Mod: ,,, | Performed by: FAMILY MEDICINE

## 2025-07-03 RX ORDER — INSULIN ASPART 100 [IU]/ML
15 INJECTION, SUSPENSION SUBCUTANEOUS 2 TIMES DAILY WITH MEALS
Status: DISCONTINUED | OUTPATIENT
Start: 2025-07-03 | End: 2025-07-05

## 2025-07-03 RX ADMIN — CITALOPRAM HYDROBROMIDE 40 MG: 20 TABLET ORAL at 09:07

## 2025-07-03 RX ADMIN — SODIUM CHLORIDE 1750 MG: 9 INJECTION, SOLUTION INTRAVENOUS at 09:07

## 2025-07-03 RX ADMIN — ENOXAPARIN SODIUM 40 MG: 40 INJECTION SUBCUTANEOUS at 05:07

## 2025-07-03 RX ADMIN — PIPERACILLIN SODIUM AND TAZOBACTAM SODIUM 4.5 G: 4; .5 INJECTION, POWDER, LYOPHILIZED, FOR SOLUTION INTRAVENOUS at 01:07

## 2025-07-03 RX ADMIN — ZOLPIDEM TARTRATE 5 MG: 5 TABLET, FILM COATED ORAL at 09:07

## 2025-07-03 RX ADMIN — GABAPENTIN 400 MG: 400 CAPSULE ORAL at 09:07

## 2025-07-03 RX ADMIN — OXYCODONE HYDROCHLORIDE AND ACETAMINOPHEN 1 TABLET: 5; 325 TABLET ORAL at 03:07

## 2025-07-03 RX ADMIN — INSULIN ASPART 1 UNITS: 100 INJECTION, SOLUTION INTRAVENOUS; SUBCUTANEOUS at 10:07

## 2025-07-03 RX ADMIN — OXYCODONE HYDROCHLORIDE AND ACETAMINOPHEN 1 TABLET: 5; 325 TABLET ORAL at 10:07

## 2025-07-03 RX ADMIN — INSULIN ASPART 15 UNITS: 100 INJECTION, SUSPENSION SUBCUTANEOUS at 05:07

## 2025-07-03 RX ADMIN — PIPERACILLIN SODIUM AND TAZOBACTAM SODIUM 4.5 G: 4; .5 INJECTION, POWDER, LYOPHILIZED, FOR SOLUTION INTRAVENOUS at 09:07

## 2025-07-03 RX ADMIN — INSULIN ASPART 4 UNITS: 100 INJECTION, SOLUTION INTRAVENOUS; SUBCUTANEOUS at 06:07

## 2025-07-03 RX ADMIN — HYDROMORPHONE HYDROCHLORIDE 0.5 MG: 2 INJECTION INTRAMUSCULAR; INTRAVENOUS; SUBCUTANEOUS at 11:07

## 2025-07-03 RX ADMIN — SODIUM CHLORIDE 1750 MG: 9 INJECTION, SOLUTION INTRAVENOUS at 10:07

## 2025-07-03 RX ADMIN — INSULIN ASPART 6 UNITS: 100 INJECTION, SOLUTION INTRAVENOUS; SUBCUTANEOUS at 01:07

## 2025-07-03 RX ADMIN — INSULIN ASPART 10 UNITS: 100 INJECTION, SUSPENSION SUBCUTANEOUS at 06:07

## 2025-07-03 RX ADMIN — OXYCODONE HYDROCHLORIDE AND ACETAMINOPHEN 1 TABLET: 5; 325 TABLET ORAL at 06:07

## 2025-07-03 RX ADMIN — ZOLPIDEM TARTRATE 5 MG: 5 TABLET, FILM COATED ORAL at 01:07

## 2025-07-03 RX ADMIN — PIPERACILLIN SODIUM AND TAZOBACTAM SODIUM 4.5 G: 4; .5 INJECTION, POWDER, LYOPHILIZED, FOR SOLUTION INTRAVENOUS at 05:07

## 2025-07-03 NOTE — SUBJECTIVE & OBJECTIVE
Interval History: NAEO.    Objective:     Vital Signs (Most Recent):  Temp: 99.1 °F (37.3 °C) (07/03/25 1710)  Pulse: 90 (07/03/25 1710)  Resp: 18 (07/03/25 1710)  BP: 101/75 (07/03/25 1710)  SpO2: 97 % (07/03/25 1710) Vital Signs (24h Range):  Temp:  [97.2 °F (36.2 °C)-99.1 °F (37.3 °C)] 99.1 °F (37.3 °C)  Pulse:  [] 90  Resp:  [14-18] 18  SpO2:  [91 %-98 %] 97 %  BP: (101-143)/(60-90) 101/75     Weight: 86.2 kg (190 lb)  Body mass index is 24.39 kg/m².     Physical Exam  Constitutional:       Appearance: Normal appearance.   HENT:      Head: Normocephalic and atraumatic.      Nose: Nose normal.   Eyes:      Extraocular Movements: Extraocular movements intact.      Pupils: Pupils are equal, round, and reactive to light.   Cardiovascular:      Rate and Rhythm: Normal rate and regular rhythm.   Pulmonary:      Effort: Pulmonary effort is normal.      Breath sounds: Normal breath sounds.   Abdominal:      General: Bowel sounds are normal.      Palpations: Abdomen is soft.   Musculoskeletal:      Comments: Ulceration noted to left hip with surrounding erythema   Skin:     General: Skin is warm and dry.      Comments: Ulcerations noted to dorsal aspect of bilateral great toes with surrounding erythema and edema   Neurological:      General: No focal deficit present.      Mental Status: She is alert and oriented to person, place, and time.   Psychiatric:         Mood and Affect: Mood normal.         Behavior: Behavior normal.              CRANIAL NERVES     CN III, IV, VI   Pupils are equal, round, and reactive to light.       Significant Labs: All pertinent labs within the past 24 hours have been reviewed.    Significant Imaging: I have reviewed all pertinent imaging results/findings within the past 24 hours.

## 2025-07-03 NOTE — ASSESSMENT & PLAN NOTE
"Patient's FSGs are uncontrolled due to hyperglycemia on current medication regimen.  Last A1c reviewed- No results found for: "LABA1C", "HGBA1C"  Most recent fingerstick glucose reviewed- No results for input(s): "POCTGLUCOSE" in the last 24 hours.  Current correctional scale  Medium  Maintain anti-hyperglycemic dose as follows-   Antihyperglycemics (From admission, onward)      Start     Stop Route Frequency Ordered    07/03/25 1645  insulin asp prt-insulin aspart (NovoLOG 70/30) injection 15 Units         -- SubQ 2 times daily with meals 07/03/25 0817    07/02/25 1721  insulin aspart U-100 injection 0-10 Units         -- SubQ Before meals & nightly PRN 07/02/25 1621          Hold Oral hypoglycemics while patient is in the hospital.    ---------------    Continue with zosyn/vancomycin IV.  Surgery consulted.  S/p debridment of bilateral toes and left hip on 7/2.  "

## 2025-07-03 NOTE — PROGRESS NOTES
[facility]  [facility] Medicine  Progress Note    Patient Name: Elieser Hendricks  MRN: [id number]  Patient Class: OP- Outpatient Recovery   Admission Date: 7/2/2025  Length of Stay: 0 days  Attending Physician: Anitha Burger DO  Primary Care Provider: No primary care provider on file.        Subjective     Principal Problem:Osteomyelitis        HPI:  Mrs. Hendricks is a 41 yo female with pmh of t2dm, htn, adhd, anxiety/depression, and ptsd. Presented to the ED with complaints of worsening pain, redness, and swelling to bilateral great toes along with left hip wound.  Symptoms started approximately 3 weeks ago and have gradually worsened. Denies injury.  She reports that she's been following with Nurse Herman at [facility]. She's been taking clindamycin without any improvement.  She endorses nausea/vomiting and abdominal discomfort. Denies fever/chills, chest pain, shortness of breath, diarrhea, constipation.      Upon arrival to the ED, vital signs were stable. WBC normal.  Bilateral foot xray reveals soft tissue swelling in right great toe with questionable osteomyelitis of distal phalanx.  General surgery was consulted while patient was in ED.  She's being admitted to hospitalist service for further evaluation and treatment.     Overview/Hospital Course:  41 yo female with uncontrolled t2dm admitted with diabetic foot infection of bilateral great toes and left hip along with osteomyelitis of left great toe now s/p debridement. Continue with IV abx and wound care. Follow-up with culture results. Surgery following.    Interval History: NAEO.    Objective:     Vital Signs (Most Recent):  Temp: 99.1 °F (37.3 °C) (07/03/25 1710)  Pulse: 90 (07/03/25 1710)  Resp: 18 (07/03/25 1710)  BP: 101/75 (07/03/25 1710)  SpO2: 97 % (07/03/25 1710) Vital Signs (24h Range):  Temp:  [97.2 °F (36.2 °C)-99.1 °F (37.3 °C)] 99.1 °F (37.3 °C)  Pulse:  [] 90  Resp:  [14-18] 18  SpO2:  [91 %-98 %] 97  "%  BP: (101-143)/(60-90) 101/75     Weight: 86.2 kg (190 lb)  Body mass index is 24.39 kg/m².     Physical Exam  Constitutional:       Appearance: Normal appearance.   HENT:      Head: Normocephalic and atraumatic.      Nose: Nose normal.   Eyes:      Extraocular Movements: Extraocular movements intact.      Pupils: Pupils are equal, round, and reactive to light.   Cardiovascular:      Rate and Rhythm: Normal rate and regular rhythm.   Pulmonary:      Effort: Pulmonary effort is normal.      Breath sounds: Normal breath sounds.   Abdominal:      General: Bowel sounds are normal.      Palpations: Abdomen is soft.   Musculoskeletal:      Comments: Ulceration noted to left hip with surrounding erythema   Skin:     General: Skin is warm and dry.      Comments: Ulcerations noted to dorsal aspect of bilateral great toes with surrounding erythema and edema   Neurological:      General: No focal deficit present.      Mental Status: She is alert and oriented to person, place, and time.   Psychiatric:         Mood and Affect: Mood normal.         Behavior: Behavior normal.              CRANIAL NERVES     CN III, IV, VI   Pupils are equal, round, and reactive to light.       Significant Labs: All pertinent labs within the past 24 hours have been reviewed.    Significant Imaging: I have reviewed all pertinent imaging results/findings within the past 24 hours.      Assessment & Plan  Osteomyelitis  Continue with zosyn/vancomycin IV.  Diabetic ulcer of left great toe  Patient's FSGs are uncontrolled due to hyperglycemia on current medication regimen.  Last A1c reviewed- No results found for: "LABA1C", "HGBA1C"  Most recent fingerstick glucose reviewed- No results for input(s): "POCTGLUCOSE" in the last 24 hours.  Current correctional scale  Medium  Maintain anti-hyperglycemic dose as follows-   Antihyperglycemics (From admission, onward)      Start     Stop Route Frequency Ordered    07/03/25 6589  insulin asp prt-insulin aspart " "(NovoLOG 70/30) injection 15 Units         -- SubQ 2 times daily with meals 07/03/25 0817    07/02/25 1721  insulin aspart U-100 injection 0-10 Units         -- SubQ Before meals & nightly PRN 07/02/25 1621          Hold Oral hypoglycemics while patient is in the hospital.    ----------    Continue with zosyn/vancomycin IV.  Surgery consulted.  S/p debridment of bilateral toes and left hip on 7/2.  Diabetic ulcer of right great toe  Patient's FSGs are uncontrolled due to hyperglycemia on current medication regimen.  Last A1c reviewed- No results found for: "LABA1C", "HGBA1C"  Most recent fingerstick glucose reviewed- No results for input(s): "POCTGLUCOSE" in the last 24 hours.  Current correctional scale  Medium  Maintain anti-hyperglycemic dose as follows-   Antihyperglycemics (From admission, onward)      Start     Stop Route Frequency Ordered    07/03/25 1645  insulin asp prt-insulin aspart (NovoLOG 70/30) injection 15 Units         -- SubQ 2 times daily with meals 07/03/25 0817    07/02/25 1721  insulin aspart U-100 injection 0-10 Units         -- SubQ Before meals & nightly PRN 07/02/25 1621          Hold Oral hypoglycemics while patient is in the hospital.    ---------------    Continue with zosyn/vancomycin IV.  Surgery consulted.  S/p debridment of bilateral toes and left hip on 7/2.  Decubitus ulcer of left hip, stage 2  Continue with zosyn/vancomycin IV.  Surgery consulted.  S/p debridment of bilateral toes and left hip on 7/2.      VTE Risk Mitigation (From admission, onward)           Ordered     enoxaparin injection 40 mg  Daily         07/02/25 1621     IP VTE HIGH RISK PATIENT  Once         07/02/25 1621     Place sequential compression device  Until discontinued         07/02/25 1621                    Discharge Planning   BISI: 7/7/2025     Code Status: Full Code   Medical Readiness for Discharge Date:   Discharge Plan A: Home with family                        Anitha BurgerDO  Department of " [facility]   [facility]

## 2025-07-03 NOTE — PROGRESS NOTES
[facility]  Wound Care    Patient Name:  Eliesre Hendricks   MRN:  [id number]  Date: 7/3/2025  Diagnosis: Osteomyelitis    History:     Past Medical History:   Diagnosis Date    Diabetes mellitus        Social History[1]    Precautions:     Allergies as of 07/02/2025    (No Known Allergies)       [facility] Assessment Details/Treatment     Narrative: Seen patient for initial preventative skin care measures.  Patient ambulates.  Ulceration noted to left hip and bilateral feet.  No foam borders, redness, or open wounds noted to bilateral heels and sacral.  Consult wound care of any other findings.      07/03/2025        [1]   Social History  Socioeconomic History    Marital status: Unknown   Tobacco Use    Smoking status: Never    Smokeless tobacco: Never   Substance and Sexual Activity    Alcohol use: Never    Drug use: Never     Social Drivers of Health     Financial Resource Strain: Medium Risk (7/3/2025)    Overall Financial Resource Strain (CARDIA)     Difficulty of Paying Living Expenses: Somewhat hard   Food Insecurity: No Food Insecurity (7/3/2025)    Hunger Vital Sign     Worried About Running Out of Food in the Last Year: Never true     Ran Out of Food in the Last Year: Never true   Transportation Needs: No Transportation Needs (7/3/2025)    PRAPARE - Transportation     Lack of Transportation (Medical): No     Lack of Transportation (Non-Medical): No   Physical Activity: Sufficiently Active (7/3/2025)    Exercise Vital Sign     Days of Exercise per Week: 5 days     Minutes of Exercise per Session: 30 min   Stress: Patient Declined (7/2/2025)    [employer] - Occupational Stress Questionnaire     Feeling of Stress : Patient declined   Housing Stability: Low Risk  (7/3/2025)    Housing Stability Vital Sign     Unable to Pay for Housing in the Last Year: No     Number of Times Moved in the Last Year: 0     Homeless in the Last Year: No

## 2025-07-03 NOTE — HOSPITAL COURSE
41 yo female with uncontrolled t2dm admitted with diabetic foot infection of bilateral great toes and left hip along with osteomyelitis of left great toe now s/p debridement. Continue with IV abx and wound care. Left hip wound culture remarkable for MRSA. Surgery following.  Will likely need 6 weeks of IV abx for treatment of left great toe osteomyelitis; however, patient is uninsured.  Will discuss further with surgery and .    7/5 - blood sugars still uncontrolled.  Novolog 70/30 increased to 17 units BID. Continue with iv abx. Await further recommendations from surgery.    7/6 - blood sugars still uncontrolled. Insulin increased.  Needs 6 wks of iv abx for treatment of left great toes osteomyelitis; however, patient is uninsured.  Will discuss further with surgery and case management.    7/7- D/W surgery and they recommend Dalvance x 2 doses (1 week apart) and PO Bactrim. OP infusion appointment set up. Stable for discharge home today.

## 2025-07-03 NOTE — ASSESSMENT & PLAN NOTE
Continue with zosyn/vancomycin IV.  Surgery consulted.  S/p debridment of bilateral toes and left hip on 7/2.

## 2025-07-03 NOTE — ASSESSMENT & PLAN NOTE
"Patient's FSGs are uncontrolled due to hyperglycemia on current medication regimen.  Last A1c reviewed- No results found for: "LABA1C", "HGBA1C"  Most recent fingerstick glucose reviewed- No results for input(s): "POCTGLUCOSE" in the last 24 hours.  Current correctional scale  Medium  Maintain anti-hyperglycemic dose as follows-   Antihyperglycemics (From admission, onward)      Start     Stop Route Frequency Ordered    07/03/25 1645  insulin asp prt-insulin aspart (NovoLOG 70/30) injection 15 Units         -- SubQ 2 times daily with meals 07/03/25 0817    07/02/25 1721  insulin aspart U-100 injection 0-10 Units         -- SubQ Before meals & nightly PRN 07/02/25 1621          Hold Oral hypoglycemics while patient is in the hospital.    ----------    Continue with zosyn/vancomycin IV.  Surgery consulted.  S/p debridment of bilateral toes and left hip on 7/2.  "

## 2025-07-03 NOTE — ANESTHESIA POSTPROCEDURE EVALUATION
Anesthesia Post Evaluation    Patient: Elieser Hendricks    Procedure(s) Performed: Procedure(s) (LRB):  DEBRIDEMENT, FOOT (Bilateral)  DEBRIDEMENT, WOUND (Left)    Final Anesthesia Type: general      Patient location during evaluation: PACU  Patient participation: Yes- Able to Participate  Level of consciousness: awake and sedated  Post-procedure vital signs: reviewed and stable  Pain management: adequate  Airway patency: patent    PONV status at discharge: No PONV  Anesthetic complications: no      Cardiovascular status: blood pressure returned to baseline  Respiratory status: unassisted  Hydration status: euvolemic  Follow-up not needed.              Vitals Value Taken Time   /78 07/02/25 18:23   Temp 36.8 °C (98.3 °F) 07/02/25 16:55   Pulse 88 07/02/25 18:23   Resp 16 07/02/25 18:06   SpO2 98 % 07/02/25 18:23   Vitals shown include unfiled device data.      Event Time   Out of Recovery 18:00:00         Pain/Enedelia Score: Pain Rating Prior to Med Admin: 9 (7/2/2025  5:39 PM)  Pain Rating Post Med Admin: 0 (7/2/2025  6:09 PM)  Enedelia Score: 9 (7/2/2025  6:00 PM)

## 2025-07-03 NOTE — PROGRESS NOTES
[facility]  General Surgery  Progress Note    Subjective:     Interval History:  Patient is awake and alert on exam this morning.  She is having breakfast.  She has no complaints.  Postop dressings to bilateral feet and left hip noted clean dry and in place. Cultures pending. Will continue daily wound care per nursing staff.  Clean wounds bilateral feet and left hip with Vashe.  Wet to dry wash dressings daily and when soiled per nursing staff.  General surgery will continue to follow patient's progress, awaiting culture results.    Post-Op Info:  Procedure(s) (LRB):  DEBRIDEMENT, FOOT (Bilateral)  DEBRIDEMENT, WOUND (Left)   1 Day Post-Op      Medications:  Continuous Infusions:   lactated ringers  125 mL/hr Intravenous Continuous 125 mL/hr at 07/02/25 1757 125 mL/hr at 07/02/25 1757     Scheduled Meds:   citalopram  40 mg Oral Daily    enoxparin  40 mg Subcutaneous Daily    gabapentin  400 mg Oral BID    insulin asp prt-insulin aspart (NovoLOG 70/30)  15 Units Subcutaneous BIDWM    piperacillin-tazobactam (Zosyn) IV (PEDS and ADULTS) (extended infusion is not appropriate)  4.5 g Intravenous Q8H    vancomycin (VANCOCIN) IV (PEDS and ADULTS)  1,750 mg Intravenous Q12H     PRN Meds:  Current Facility-Administered Medications:     dextrose 50%, 12.5 g, Intravenous, PRN    dextrose 50%, 25 g, Intravenous, PRN    glucagon (human recombinant), 1 mg, Intramuscular, PRN    glucose, 16 g, Oral, PRN    glucose, 24 g, Oral, PRN    HYDROmorphone, 0.5 mg, Intravenous, Q3H PRN    insulin aspart U-100, 0-10 Units, Subcutaneous, QID (AC + HS) PRN    naloxone, 0.02 mg, Intravenous, PRN    ondansetron, 4 mg, Intravenous, Q6H PRN    oxyCODONE-acetaminophen, 1 tablet, Oral, Q6H PRN    prochlorperazine, 5 mg, Intravenous, Q6H PRN    sodium chloride 0.9%, 10 mL, Intravenous, Q12H PRN    Pharmacy to dose Vancomycin consult, , , Once **AND** vancomycin - pharmacy to dose, , Intravenous, pharmacy to manage  frequency    zolpidem, 5 mg, Oral, Nightly PRN     Objective:     Vital Signs (Most Recent):  Temp: 98 °F (36.7 °C) (07/03/25 1144)  Pulse: 74 (07/03/25 1144)  Resp: 18 (07/03/25 1144)  BP: 129/89 (07/03/25 1144)  SpO2: 97 % (07/03/25 1144) Vital Signs (24h Range):  Temp:  [97.2 °F (36.2 °C)-98.3 °F (36.8 °C)] 98 °F (36.7 °C)  Pulse:  [] 74  Resp:  [10-18] 18  SpO2:  [91 %-100 %] 97 %  BP: ()/(52-90) 129/89       Intake/Output Summary (Last 24 hours) at 7/3/2025 1306  Last data filed at 7/3/2025 0515  Gross per 24 hour   Intake 2277.54 ml   Output --   Net 2277.54 ml       Physical Exam  Vitals and nursing note reviewed.   HENT:      Head: Normocephalic.      Nose: Nose normal.      Mouth/Throat:      Mouth: Mucous membranes are moist.   Eyes:      Extraocular Movements: Extraocular movements intact.   Cardiovascular:      Rate and Rhythm: Normal rate.   Pulmonary:      Effort: Pulmonary effort is normal.   Abdominal:      General: Bowel sounds are normal.      Palpations: Abdomen is soft.   Musculoskeletal:         General: Normal range of motion.      Cervical back: Normal range of motion.        Feet:    Skin:     General: Skin is warm and dry.             Comments: Postop dressing to bilateral feet noted clean dry and intact placed.  Stirrup dressing to left hip noted dry and intact.   Neurological:      General: No focal deficit present.      Mental Status: She is alert and oriented to person, place, and time.   Psychiatric:         Mood and Affect: Mood normal.         Significant Labs:  Recent Lab Results  (Last 5 results in the past 24 hours)        07/03/25  1140   07/03/25  0800   07/03/25  0502   07/03/25  0456   07/02/25  2116        Anion Gap       10         Baso #       0.03         Basophil %       0.6         BUN       14         BUN/CREAT RATIO       18         Calcium       8.9         Chloride       105         CO2       25         Creatinine       0.76         Culture, Wound/Abscess                Differential Method       Auto         eGFR       102  Comment: Estimated GFR calculated using the CKD-EPI creatinine (2021) equation.         Eos #       0.26         Eos %       5.2         Glucose       245         Hematocrit       30.7         Hemoglobin       10.4         Immature Grans (Abs)       0.01         Immature Granulocytes       0.2         Lymph #       2.59         Lymph %       51.6         MCH       27.6         MCHC       33.9         MCV       81.4         Mono #       0.40         Mono %       8.0         MPV       9.6         Neutrophils, Abs       1.73         Neutrophils Relative       34.4         nRBC       0.0         NUCLEATED RBC ABSOLUTE       0.00         Platelet Count       392         POC Glucose 265   206   241     219       Potassium       4.4         RBC       3.77         RDW       12.3         Sodium       136         WBC       5.02                              All pertinent labs from the last 24 hours have been reviewed.    Significant Diagnostics:  I have reviewed all pertinent imaging results/findings within the past 24 hours.    Assessment/Plan:     Active Diagnoses:    Diagnosis Date Noted POA    PRINCIPAL PROBLEM:  Osteomyelitis [M86.9] 07/02/2025 Yes    Diabetic ulcer of left great toe [E11.621, L97.529] 07/02/2025 Yes    Diabetic ulcer of right great toe [E11.621, L97.519] 07/02/2025 Yes    Decubitus ulcer of left hip, stage 2 [L89.222] 07/02/2025 Yes      Problems Resolved During this Admission:         Keila Ray, AAKASH  General Surgery  [facility]

## 2025-07-03 NOTE — PLAN OF CARE
[facility]  Initial Discharge Assessment       Primary Care Provider: No primary care provider on file.    Admission Diagnosis: Abscess [L02.91]  Abscess of foot [L02.619]  Wound, open, toe [S91.109A]  Open wound of left hip, initial encounter [S71.002A]    Admission Date: 7/2/2025  Expected Discharge Date:     Transition of Care Barriers: Unisured    Payor: /     Extended Emergency Contact Information  Primary Emergency Contact: Adolfo Milton  Mobile Phone: [phone]  Relation: Spouse  Preferred language: English   needed? No    Discharge Plan A: Home with family  Discharge Plan B: Long-term acute care facility (LTAC), Rehab, Skilled Nursing Facility      [id number] - [place], [place] - [address] AT [facility]  [address]  [facility] [address]  Phone: [phone] Fax: [phone]    [address] [facility]  [address] [facility] [address]  Phone: [phone] Fax: [phone]      Initial Assessment (most recent)       Adult Discharge Assessment - 07/03/25 0946          Discharge Assessment    Assessment Type Discharge Planning Assessment     Source of Information patient;family     Communicated BISI with patient/caregiver Date not available/Unable to determine     People in Home spouse     Name(s) of People in Home Adolfo Milton, , [phone]     Do you expect to return to your current living situation? Yes     Do you have help at home or someone to help you manage your care at home? Yes     Who are your caregiver(s) and their phone number(s)? Adolfo Milton , [phone]     Prior to hospitilization cognitive status: Unable to Assess     Current cognitive status: Alert/Oriented     Walking or Climbing Stairs Difficulty no     Dressing/Bathing Difficulty no     Home Layout Able to live on 1st floor     Equipment Currently Used at Home none     Patient currently being followed by  outpatient case management? No     Do you currently have service(s) that help you manage your care at home? No     Do you take prescription medications? Yes     Do you have prescription coverage? No     Do you have any problems affording any of your prescribed medications? TBD     Who is going to help you get home at discharge? family     How do you get to doctors appointments? car, drives self;family or friend will provide     Are you on dialysis? No     Discharge Plan A Home with family     Discharge Plan B Long-term acute care facility (LTAC);Rehab;Skilled Nursing Facility     DME Needed Upon Discharge  none     Discharge Plan discussed with: Spouse/sig other;Patient     Name(s) and Number(s) Adolfo Milton, , [phone]     Transition of Care Barriers Unisured        Physical Activity    On average, how many days per week do you engage in moderate to strenuous exercise (like a brisk walk)? 5 days     On average, how many minutes do you engage in exercise at this level? 30 min        Financial Resource Strain    How hard is it for you to pay for the very basics like food, housing, medical care, and heating? Somewhat hard        Housing Stability    In the last 12 months, was there a time when you were not able to pay the mortgage or rent on time? No     At any time in the past 12 months, were you homeless or living in a shelter (including now)? No        Transportation Needs    In the past 12 months, has lack of transportation kept you from medical appointments or from getting medications? No     In the past 12 months, has lack of transportation kept you from meetings, work, or from getting things needed for daily living? No        Food Insecurity    Within the past 12 months, you worried that your food would run out before you got the money to buy more. Never true     Within the past 12 months, the food you bought just didn't last and you didn't have money to get more. Never true        Alcohol Use    Q1:  How often do you have a drink containing alcohol? Never     Q2: How many drinks containing alcohol do you have on a typical day when you are drinking? Patient does not drink     Q3: How often do you have six or more drinks on one occasion? Never        Utilities    In the past 12 months has the electric, gas, oil, or water company threatened to shut off services in your home? No        Health Literacy    How often do you need to have someone help you when you read instructions, pamphlets, or other written material from your doctor or pharmacy? Rarely                 Met with patient and  in patient's room this am.  It was noted that she lives at home with  and plans to return upon dc.  She does not have hh or dme.  Patient does not have insurance so CM referred to Imani in financial assistance.  SDOH completed.  Will continue to follow for anticipated dc needs.

## 2025-07-04 LAB
ANION GAP SERPL CALCULATED.3IONS-SCNC: 11 MMOL/L (ref 7–16)
BASOPHILS # BLD AUTO: 0.03 K/UL (ref 0–0.2)
BASOPHILS NFR BLD AUTO: 0.6 % (ref 0–1)
BUN SERPL-MCNC: 10 MG/DL (ref 7–19)
BUN/CREAT SERPL: 14 (ref 6–20)
CALCIUM SERPL-MCNC: 9.1 MG/DL (ref 8.4–10.2)
CHLORIDE SERPL-SCNC: 104 MMOL/L (ref 98–107)
CO2 SERPL-SCNC: 25 MMOL/L (ref 22–29)
CREAT SERPL-MCNC: 0.73 MG/DL (ref 0.55–1.02)
DIFFERENTIAL METHOD BLD: ABNORMAL
EGFR (NO RACE VARIABLE) (RUSH/TITUS): 107 ML/MIN/1.73M2
EOSINOPHIL # BLD AUTO: 0.15 K/UL (ref 0–0.5)
EOSINOPHIL NFR BLD AUTO: 3.1 % (ref 1–4)
ERYTHROCYTE [DISTWIDTH] IN BLOOD BY AUTOMATED COUNT: 12.3 % (ref 11.5–14.5)
GLUCOSE SERPL-MCNC: 215 MG/DL (ref 70–105)
GLUCOSE SERPL-MCNC: 222 MG/DL (ref 70–105)
GLUCOSE SERPL-MCNC: 268 MG/DL (ref 70–105)
GLUCOSE SERPL-MCNC: 273 MG/DL (ref 70–105)
GLUCOSE SERPL-MCNC: 280 MG/DL (ref 74–100)
HCT VFR BLD AUTO: 30.8 % (ref 38–47)
HGB BLD-MCNC: 10.4 G/DL (ref 12–16)
IMM GRANULOCYTES # BLD AUTO: 0.01 K/UL (ref 0–0.04)
IMM GRANULOCYTES NFR BLD: 0.2 % (ref 0–0.4)
LYMPHOCYTES # BLD AUTO: 2.84 K/UL (ref 1–4.8)
LYMPHOCYTES NFR BLD AUTO: 58.4 % (ref 27–41)
MCH RBC QN AUTO: 27.7 PG (ref 27–31)
MCHC RBC AUTO-ENTMCNC: 33.8 G/DL (ref 32–36)
MCV RBC AUTO: 81.9 FL (ref 80–96)
MICROORGANISM SPEC CULT: ABNORMAL
MONOCYTES # BLD AUTO: 0.39 K/UL (ref 0–0.8)
MONOCYTES NFR BLD AUTO: 8 % (ref 2–6)
MPC BLD CALC-MCNC: 9.8 FL (ref 9.4–12.4)
NEUTROPHILS # BLD AUTO: 1.44 K/UL (ref 1.8–7.7)
NEUTROPHILS NFR BLD AUTO: 29.7 % (ref 53–65)
NRBC # BLD AUTO: 0 X10E3/UL
NRBC, AUTO (.00): 0 %
PLATELET # BLD AUTO: 359 K/UL (ref 150–400)
POTASSIUM SERPL-SCNC: 4 MMOL/L (ref 3.5–5.1)
RBC # BLD AUTO: 3.76 M/UL (ref 4.2–5.4)
SODIUM SERPL-SCNC: 136 MMOL/L (ref 136–145)
VANCOMYCIN TROUGH SERPL-MCNC: 15.9 ΜG/ML (ref 15–20)
WBC # BLD AUTO: 4.86 K/UL (ref 4.5–11)

## 2025-07-04 PROCEDURE — 11000001 HC ACUTE MED/SURG PRIVATE ROOM

## 2025-07-04 PROCEDURE — 82962 GLUCOSE BLOOD TEST: CPT

## 2025-07-04 PROCEDURE — 25000003 PHARM REV CODE 250: Performed by: STUDENT IN AN ORGANIZED HEALTH CARE EDUCATION/TRAINING PROGRAM

## 2025-07-04 PROCEDURE — 80202 ASSAY OF VANCOMYCIN: CPT | Performed by: STUDENT IN AN ORGANIZED HEALTH CARE EDUCATION/TRAINING PROGRAM

## 2025-07-04 PROCEDURE — 63600175 PHARM REV CODE 636 W HCPCS

## 2025-07-04 PROCEDURE — 27000207 HC ISOLATION

## 2025-07-04 PROCEDURE — 94761 N-INVAS EAR/PLS OXIMETRY MLT: CPT

## 2025-07-04 PROCEDURE — 27000221 HC OXYGEN, UP TO 24 HOURS

## 2025-07-04 PROCEDURE — 85025 COMPLETE CBC W/AUTO DIFF WBC: CPT | Performed by: STUDENT IN AN ORGANIZED HEALTH CARE EDUCATION/TRAINING PROGRAM

## 2025-07-04 PROCEDURE — 63600175 PHARM REV CODE 636 W HCPCS: Performed by: STUDENT IN AN ORGANIZED HEALTH CARE EDUCATION/TRAINING PROGRAM

## 2025-07-04 PROCEDURE — 80048 BASIC METABOLIC PNL TOTAL CA: CPT | Performed by: STUDENT IN AN ORGANIZED HEALTH CARE EDUCATION/TRAINING PROGRAM

## 2025-07-04 PROCEDURE — 36415 COLL VENOUS BLD VENIPUNCTURE: CPT | Performed by: STUDENT IN AN ORGANIZED HEALTH CARE EDUCATION/TRAINING PROGRAM

## 2025-07-04 PROCEDURE — 63600175 PHARM REV CODE 636 W HCPCS: Performed by: FAMILY MEDICINE

## 2025-07-04 PROCEDURE — 25000003 PHARM REV CODE 250: Performed by: FAMILY MEDICINE

## 2025-07-04 PROCEDURE — 99233 SBSQ HOSP IP/OBS HIGH 50: CPT | Mod: ,,, | Performed by: FAMILY MEDICINE

## 2025-07-04 RX ORDER — CALCIUM CARBONATE 200(500)MG
500 TABLET,CHEWABLE ORAL 3 TIMES DAILY PRN
Status: DISCONTINUED | OUTPATIENT
Start: 2025-07-04 | End: 2025-07-07 | Stop reason: HOSPADM

## 2025-07-04 RX ORDER — SIMETHICONE 80 MG
2 TABLET,CHEWABLE ORAL 3 TIMES DAILY PRN
Status: DISCONTINUED | OUTPATIENT
Start: 2025-07-04 | End: 2025-07-07 | Stop reason: HOSPADM

## 2025-07-04 RX ORDER — TALC
6 POWDER (GRAM) TOPICAL NIGHTLY
Status: DISCONTINUED | OUTPATIENT
Start: 2025-07-04 | End: 2025-07-07 | Stop reason: HOSPADM

## 2025-07-04 RX ORDER — TEMAZEPAM 15 MG/1
30 CAPSULE ORAL NIGHTLY PRN
Status: DISCONTINUED | OUTPATIENT
Start: 2025-07-04 | End: 2025-07-07 | Stop reason: HOSPADM

## 2025-07-04 RX ORDER — MUPIROCIN 20 MG/G
OINTMENT TOPICAL 2 TIMES DAILY
Status: DISCONTINUED | OUTPATIENT
Start: 2025-07-04 | End: 2025-07-07 | Stop reason: HOSPADM

## 2025-07-04 RX ORDER — SIMETHICONE 80 MG
1 TABLET,CHEWABLE ORAL 3 TIMES DAILY PRN
Status: DISCONTINUED | OUTPATIENT
Start: 2025-07-04 | End: 2025-07-04

## 2025-07-04 RX ORDER — PANTOPRAZOLE SODIUM 40 MG/1
40 TABLET, DELAYED RELEASE ORAL DAILY
Status: DISCONTINUED | OUTPATIENT
Start: 2025-07-04 | End: 2025-07-07 | Stop reason: HOSPADM

## 2025-07-04 RX ADMIN — INSULIN ASPART 15 UNITS: 100 INJECTION, SUSPENSION SUBCUTANEOUS at 04:07

## 2025-07-04 RX ADMIN — OXYCODONE HYDROCHLORIDE AND ACETAMINOPHEN 1 TABLET: 5; 325 TABLET ORAL at 11:07

## 2025-07-04 RX ADMIN — MUPIROCIN: 20 OINTMENT TOPICAL at 09:07

## 2025-07-04 RX ADMIN — OXYCODONE HYDROCHLORIDE AND ACETAMINOPHEN 1 TABLET: 5; 325 TABLET ORAL at 01:07

## 2025-07-04 RX ADMIN — CITALOPRAM HYDROBROMIDE 40 MG: 20 TABLET ORAL at 08:07

## 2025-07-04 RX ADMIN — OXYCODONE HYDROCHLORIDE AND ACETAMINOPHEN 1 TABLET: 5; 325 TABLET ORAL at 08:07

## 2025-07-04 RX ADMIN — PIPERACILLIN SODIUM AND TAZOBACTAM SODIUM 4.5 G: 4; .5 INJECTION, POWDER, LYOPHILIZED, FOR SOLUTION INTRAVENOUS at 01:07

## 2025-07-04 RX ADMIN — HYDROMORPHONE HYDROCHLORIDE 0.5 MG: 2 INJECTION INTRAMUSCULAR; INTRAVENOUS; SUBCUTANEOUS at 06:07

## 2025-07-04 RX ADMIN — Medication 6 MG: at 09:07

## 2025-07-04 RX ADMIN — INSULIN ASPART 3 UNITS: 100 INJECTION, SOLUTION INTRAVENOUS; SUBCUTANEOUS at 09:07

## 2025-07-04 RX ADMIN — CALCIUM CARBONATE (ANTACID) CHEW TAB 500 MG 500 MG: 500 CHEW TAB at 06:07

## 2025-07-04 RX ADMIN — INSULIN ASPART 6 UNITS: 100 INJECTION, SOLUTION INTRAVENOUS; SUBCUTANEOUS at 12:07

## 2025-07-04 RX ADMIN — PANTOPRAZOLE SODIUM 40 MG: 40 TABLET, DELAYED RELEASE ORAL at 11:07

## 2025-07-04 RX ADMIN — GABAPENTIN 400 MG: 400 CAPSULE ORAL at 08:07

## 2025-07-04 RX ADMIN — MUPIROCIN: 20 OINTMENT TOPICAL at 11:07

## 2025-07-04 RX ADMIN — HYDROMORPHONE HYDROCHLORIDE 0.5 MG: 2 INJECTION INTRAMUSCULAR; INTRAVENOUS; SUBCUTANEOUS at 09:07

## 2025-07-04 RX ADMIN — SODIUM CHLORIDE 1750 MG: 9 INJECTION, SOLUTION INTRAVENOUS at 11:07

## 2025-07-04 RX ADMIN — ENOXAPARIN SODIUM 40 MG: 40 INJECTION SUBCUTANEOUS at 04:07

## 2025-07-04 RX ADMIN — SODIUM CHLORIDE 1750 MG: 9 INJECTION, SOLUTION INTRAVENOUS at 10:07

## 2025-07-04 RX ADMIN — OXYCODONE HYDROCHLORIDE AND ACETAMINOPHEN 1 TABLET: 5; 325 TABLET ORAL at 05:07

## 2025-07-04 RX ADMIN — GABAPENTIN 400 MG: 400 CAPSULE ORAL at 09:07

## 2025-07-04 RX ADMIN — PIPERACILLIN SODIUM AND TAZOBACTAM SODIUM 4.5 G: 4; .5 INJECTION, POWDER, LYOPHILIZED, FOR SOLUTION INTRAVENOUS at 08:07

## 2025-07-04 RX ADMIN — SIMETHICONE 160 MG: 80 TABLET, CHEWABLE ORAL at 10:07

## 2025-07-04 RX ADMIN — INSULIN ASPART 15 UNITS: 100 INJECTION, SUSPENSION SUBCUTANEOUS at 08:07

## 2025-07-04 RX ADMIN — PIPERACILLIN SODIUM AND TAZOBACTAM SODIUM 4.5 G: 4; .5 INJECTION, POWDER, LYOPHILIZED, FOR SOLUTION INTRAVENOUS at 04:07

## 2025-07-04 NOTE — PROGRESS NOTES
Pharmacokinetic Assessment Follow Up: IV Vancomycin    Vancomycin serum concentration assessment(s):    The trough level was drawn correctly and can be used to guide therapy at this time. The measurement is within the desired definitive target range of 15 to 20 mcg/mL.    Vancomycin Regimen Plan:    Continue regimen of Vancomycin 1750 mg IV every 12 hours with next serum trough concentration measured at 0930 on 7/6    Drug levels (last 3 results):  Recent Labs   Lab Result Units 07/04/25  0951   Vancomycin, Trough µg/mL 15.9       Pharmacy will continue to follow and monitor vancomycin.    Please contact pharmacy at extension [phone] for questions regarding this assessment.    Patient brief summary:  Elieser Hendricks is a 40 y.o. female initiated on antimicrobial therapy with IV Vancomycin for treatment of bone/joint infection    The patient's current regimen is vanc 1750 mg IV q12h    Drug Allergies:   Review of patient's allergies indicates:  No Known Allergies    Actual Body Weight:   86.2 kg    Renal Function:   Estimated Creatinine Clearance: 125.7 mL/min (based on SCr of 0.73 mg/dL).,     Dialysis Method (if applicable):  N/A    CBC (last 72 hours):  Recent Labs   Lab Result Units 07/02/25  1158 07/03/25  0456 07/04/25  0503   WBC K/uL 6.32 5.02 4.86   Hemoglobin g/dL 11.9* 10.4* 10.4*   Hematocrit % 35.2* 30.7* 30.8*   Platelet Count K/uL 457* 392 359   Lymphocytes % % 43.8* 51.6* 58.4*   Monocytes % % 10.3* 8.0* 8.0*   Eosinophils % % 5.1* 5.2* 3.1   Basophils % % 0.6 0.6 0.6   Diff Type  Auto Auto Auto       Metabolic Panel (last 72 hours):  Recent Labs   Lab Result Units 07/02/25  1158 07/03/25  0456 07/04/25  0502   Sodium mmol/L 133* 136 136   Potassium mmol/L 5.0 4.4 4.0   Chloride mmol/L 98 105 104   CO2 mmol/L 24 25 25   Glucose mg/dL 342* 245* 280*   BUN mg/dL 22* 14 10   Creatinine mg/dL 0.92 0.76 0.73       Vancomycin Administrations:  vancomycin given in the last 96 hours                      vancomycin (VANCOCIN) 1,750 mg in 0.9% NaCl 500 mL IVPB ()  Restarted 07/03/25 2326      Restarted  2243     1,750 mg New Bag  2240      Restarted  1000     1,750 mg New Bag  0955     1,750 mg New Bag 07/02/25 2128                    Microbiologic Results:  Microbiology Results (last 7 days)       Procedure Component Value Units Date/Time    Culture, Wound [[id number]]  (Abnormal)  (Susceptibility) Collected: 07/02/25 1640    Order Status: Completed Specimen: Wound from Hip, Left Updated: 07/04/25 0750     Culture, Wound/Abscess Heavy Growth Methicillin resistant Staphylococcus aureus    Culture, Anaerobe [[id number]] Collected: 07/02/25 1640    Order Status: Resulted Specimen: Wound from Hip, Left Updated: 07/02/25 1701

## 2025-07-04 NOTE — PROGRESS NOTES
[facility]  [facility]  Progress Note    Patient Name: Elieser Hendricks  MRN: [id number]  Patient Class: IP- Inpatient   Admission Date: 7/2/2025  Length of Stay: 0 days  Attending Physician: Anitha Burger DO  Primary Care Provider: No primary care provider on file.        Subjective     Principal Problem:Osteomyelitis        HPI:  Mrs. Hendricks is a 41 yo female with pmh of t2dm, htn, adhd, anxiety/depression, and ptsd. Presented to the ED with complaints of worsening pain, redness, and swelling to bilateral great toes along with left hip wound.  Symptoms started approximately 3 weeks ago and have gradually worsened. Denies injury.  She reports that she's been following with Nurse Herman at [facility]. She's been taking clindamycin without any improvement.  She endorses nausea/vomiting and abdominal discomfort. Denies fever/chills, chest pain, shortness of breath, diarrhea, constipation.      Upon arrival to the ED, vital signs were stable. WBC normal.  Bilateral foot xray reveals soft tissue swelling in right great toe with questionable osteomyelitis of distal phalanx.  General surgery was consulted while patient was in ED.  She's being admitted to hospitalist service for further evaluation and treatment.     Overview/Hospital Course:  41 yo female with uncontrolled t2dm admitted with diabetic foot infection of bilateral great toes and left hip along with osteomyelitis of left great toe now s/p debridement. Continue with IV abx and wound care. Left hip wound culture remarkable for MRSA. Surgery following.  Will likely need 6 weeks of IV abx for treatment of left great toe osteomyelitis; however, patient is uninsured.  Will discuss further with surgery and .    Interval History: Requesting medication for gas.  Also would like to take home restoril as ambien isn't working.    Objective:     Vital Signs (Most Recent):  Temp: 98.4 °F (36.9 °C) (07/04/25 1153)  Pulse:  "76 (07/04/25 1153)  Resp: 18 (07/04/25 1153)  BP: 117/77 (07/04/25 1153)  SpO2: 95 % (07/04/25 1153) Vital Signs (24h Range):  Temp:  [98 °F (36.7 °C)-99.1 °F (37.3 °C)] 98.4 °F (36.9 °C)  Pulse:  [74-90] 76  Resp:  [16-20] 18  SpO2:  [95 %-98 %] 95 %  BP: (101-150)/(55-92) 117/77     Weight: 86.2 kg (190 lb)  Body mass index is 24.39 kg/m².     Physical Exam  Constitutional:       Appearance: Normal appearance.   HENT:      Head: Normocephalic and atraumatic.      Nose: Nose normal.   Eyes:      Extraocular Movements: Extraocular movements intact.      Pupils: Pupils are equal, round, and reactive to light.   Cardiovascular:      Rate and Rhythm: Normal rate and regular rhythm.   Pulmonary:      Effort: Pulmonary effort is normal.      Breath sounds: Normal breath sounds.   Abdominal:      General: Bowel sounds are normal.      Palpations: Abdomen is soft.   Musculoskeletal:      Comments: Ulceration noted to left hip with surrounding erythema   Skin:     General: Skin is warm and dry.      Comments: Ulcerations noted to dorsal aspect of bilateral great toes with surrounding erythema and edema   Neurological:      General: No focal deficit present.      Mental Status: She is alert and oriented to person, place, and time.   Psychiatric:         Mood and Affect: Mood normal.         Behavior: Behavior normal.              CRANIAL NERVES     CN III, IV, VI   Pupils are equal, round, and reactive to light.       Significant Labs: All pertinent labs within the past 24 hours have been reviewed.    Significant Imaging: I have reviewed all pertinent imaging results/findings within the past 24 hours.  Review of Systems      Assessment & Plan  Osteomyelitis  Continue with zosyn/vancomycin IV.  Diabetic ulcer of left great toe  Patient's FSGs are uncontrolled due to hyperglycemia on current medication regimen.  Last A1c reviewed- No results found for: "LABA1C", "HGBA1C"  Most recent fingerstick glucose reviewed- No results " "for input(s): "POCTGLUCOSE" in the last 24 hours.  Current correctional scale  Medium  Maintain anti-hyperglycemic dose as follows-   Antihyperglycemics (From admission, onward)      Start     Stop Route Frequency Ordered    07/03/25 1645  insulin asp prt-insulin aspart (NovoLOG 70/30) injection 15 Units         -- SubQ 2 times daily with meals 07/03/25 0817    07/02/25 1721  insulin aspart U-100 injection 0-10 Units         -- SubQ Before meals & nightly PRN 07/02/25 1621          Hold Oral hypoglycemics while patient is in the hospital.    ----------    Continue with zosyn/vancomycin IV.  Surgery consulted.  S/p debridment of bilateral toes and left hip on 7/2.  Diabetic ulcer of right great toe  Patient's FSGs are uncontrolled due to hyperglycemia on current medication regimen.  Last A1c reviewed- No results found for: "LABA1C", "HGBA1C"  Most recent fingerstick glucose reviewed- No results for input(s): "POCTGLUCOSE" in the last 24 hours.  Current correctional scale  Medium  Maintain anti-hyperglycemic dose as follows-   Antihyperglycemics (From admission, onward)      Start     Stop Route Frequency Ordered    07/03/25 1645  insulin asp prt-insulin aspart (NovoLOG 70/30) injection 15 Units         -- SubQ 2 times daily with meals 07/03/25 0817    07/02/25 1721  insulin aspart U-100 injection 0-10 Units         -- SubQ Before meals & nightly PRN 07/02/25 1621          Hold Oral hypoglycemics while patient is in the hospital.    ---------------    Continue with zosyn/vancomycin IV.  Surgery consulted.  S/p debridment of bilateral toes and left hip on 7/2.  Decubitus ulcer of left hip, stage 2  Continue with zosyn/vancomycin IV.  Surgery consulted.  S/p debridment of bilateral toes and left hip on 7/2.  Wound culture remarkable for MRSA.    VTE Risk Mitigation (From admission, onward)           Ordered     enoxaparin injection 40 mg  Daily         07/02/25 1621     IP VTE HIGH RISK PATIENT  Once         07/02/25 " 1621     Place sequential compression device  Until discontinued         07/02/25 1621                    Discharge Planning   BISI: 7/7/2025     Code Status: Full Code   Medical Readiness for Discharge Date:   Discharge Plan A: Home with family                        Anitha Burger DO  Department of Hospital Medicine   [facility]

## 2025-07-04 NOTE — PLAN OF CARE
Problem: Adult Inpatient Plan of Care  Goal: Plan of Care Review  Outcome: Progressing  Goal: Patient-Specific Goal (Individualized)  Outcome: Progressing  Goal: Absence of Hospital-Acquired Illness or Injury  Outcome: Progressing  Goal: Optimal Comfort and Wellbeing  Outcome: Progressing  Goal: Readiness for Transition of Care  Outcome: Progressing     Problem: Diabetes Comorbidity  Goal: Blood Glucose Level Within Targeted Range  Outcome: Progressing     Problem: Wound  Goal: Optimal Coping  Outcome: Progressing  Goal: Optimal Functional Ability  Outcome: Progressing  Goal: Absence of Infection Signs and Symptoms  Outcome: Progressing  Goal: Improved Oral Intake  Outcome: Progressing  Goal: Optimal Pain Control and Function  Outcome: Progressing  Goal: Skin Health and Integrity  Outcome: Progressing  Goal: Optimal Wound Healing  Outcome: Progressing     Problem: Pain Acute  Goal: Optimal Pain Control and Function  Outcome: Progressing     Problem: Fall Injury Risk  Goal: Absence of Fall and Fall-Related Injury  Outcome: Progressing

## 2025-07-04 NOTE — SUBJECTIVE & OBJECTIVE
Interval History: Requesting medication for gas.  Also would like to take home restoril as ambien isn't working.    Objective:     Vital Signs (Most Recent):  Temp: 98.4 °F (36.9 °C) (07/04/25 1153)  Pulse: 76 (07/04/25 1153)  Resp: 18 (07/04/25 1153)  BP: 117/77 (07/04/25 1153)  SpO2: 95 % (07/04/25 1153) Vital Signs (24h Range):  Temp:  [98 °F (36.7 °C)-99.1 °F (37.3 °C)] 98.4 °F (36.9 °C)  Pulse:  [74-90] 76  Resp:  [16-20] 18  SpO2:  [95 %-98 %] 95 %  BP: (101-150)/(55-92) 117/77     Weight: 86.2 kg (190 lb)  Body mass index is 24.39 kg/m².     Physical Exam  Constitutional:       Appearance: Normal appearance.   HENT:      Head: Normocephalic and atraumatic.      Nose: Nose normal.   Eyes:      Extraocular Movements: Extraocular movements intact.      Pupils: Pupils are equal, round, and reactive to light.   Cardiovascular:      Rate and Rhythm: Normal rate and regular rhythm.   Pulmonary:      Effort: Pulmonary effort is normal.      Breath sounds: Normal breath sounds.   Abdominal:      General: Bowel sounds are normal.      Palpations: Abdomen is soft.   Musculoskeletal:      Comments: Ulceration noted to left hip with surrounding erythema   Skin:     General: Skin is warm and dry.      Comments: Ulcerations noted to dorsal aspect of bilateral great toes with surrounding erythema and edema   Neurological:      General: No focal deficit present.      Mental Status: She is alert and oriented to person, place, and time.   Psychiatric:         Mood and Affect: Mood normal.         Behavior: Behavior normal.              CRANIAL NERVES     CN III, IV, VI   Pupils are equal, round, and reactive to light.       Significant Labs: All pertinent labs within the past 24 hours have been reviewed.    Significant Imaging: I have reviewed all pertinent imaging results/findings within the past 24 hours.  Review of Systems

## 2025-07-04 NOTE — ASSESSMENT & PLAN NOTE
Continue with zosyn/vancomycin IV.  Surgery consulted.  S/p debridment of bilateral toes and left hip on 7/2.  Wound culture remarkable for MRSA.

## 2025-07-05 LAB
ANION GAP SERPL CALCULATED.3IONS-SCNC: 11 MMOL/L (ref 7–16)
BASOPHILS # BLD AUTO: 0.04 K/UL (ref 0–0.2)
BASOPHILS NFR BLD AUTO: 0.8 % (ref 0–1)
BUN SERPL-MCNC: 11 MG/DL (ref 7–19)
BUN/CREAT SERPL: 11 (ref 6–20)
CALCIUM SERPL-MCNC: 8.9 MG/DL (ref 8.4–10.2)
CHLORIDE SERPL-SCNC: 106 MMOL/L (ref 98–107)
CO2 SERPL-SCNC: 26 MMOL/L (ref 22–29)
CREAT SERPL-MCNC: 1 MG/DL (ref 0.55–1.02)
DIFFERENTIAL METHOD BLD: ABNORMAL
EGFR (NO RACE VARIABLE) (RUSH/TITUS): 73 ML/MIN/1.73M2
EOSINOPHIL # BLD AUTO: 0.11 K/UL (ref 0–0.5)
EOSINOPHIL NFR BLD AUTO: 2.3 % (ref 1–4)
ERYTHROCYTE [DISTWIDTH] IN BLOOD BY AUTOMATED COUNT: 12.5 % (ref 11.5–14.5)
GLUCOSE SERPL-MCNC: 235 MG/DL (ref 70–105)
GLUCOSE SERPL-MCNC: 241 MG/DL (ref 70–105)
GLUCOSE SERPL-MCNC: 325 MG/DL (ref 74–100)
GLUCOSE SERPL-MCNC: 328 MG/DL (ref 70–105)
GLUCOSE SERPL-MCNC: 425 MG/DL (ref 70–105)
HCT VFR BLD AUTO: 30.8 % (ref 38–47)
HGB BLD-MCNC: 10.2 G/DL (ref 12–16)
IMM GRANULOCYTES # BLD AUTO: 0.01 K/UL (ref 0–0.04)
IMM GRANULOCYTES NFR BLD: 0.2 % (ref 0–0.4)
LYMPHOCYTES # BLD AUTO: 2.58 K/UL (ref 1–4.8)
LYMPHOCYTES NFR BLD AUTO: 53.6 % (ref 27–41)
MCH RBC QN AUTO: 27.1 PG (ref 27–31)
MCHC RBC AUTO-ENTMCNC: 33.1 G/DL (ref 32–36)
MCV RBC AUTO: 81.7 FL (ref 80–96)
MONOCYTES # BLD AUTO: 0.34 K/UL (ref 0–0.8)
MONOCYTES NFR BLD AUTO: 7.1 % (ref 2–6)
MPC BLD CALC-MCNC: 9.8 FL (ref 9.4–12.4)
NEUTROPHILS # BLD AUTO: 1.73 K/UL (ref 1.8–7.7)
NEUTROPHILS NFR BLD AUTO: 36 % (ref 53–65)
NRBC # BLD AUTO: 0 X10E3/UL
NRBC, AUTO (.00): 0 %
PLATELET # BLD AUTO: 357 K/UL (ref 150–400)
POTASSIUM SERPL-SCNC: 4.2 MMOL/L (ref 3.5–5.1)
RBC # BLD AUTO: 3.77 M/UL (ref 4.2–5.4)
SODIUM SERPL-SCNC: 139 MMOL/L (ref 136–145)
WBC # BLD AUTO: 4.81 K/UL (ref 4.5–11)

## 2025-07-05 PROCEDURE — 85025 COMPLETE CBC W/AUTO DIFF WBC: CPT | Performed by: STUDENT IN AN ORGANIZED HEALTH CARE EDUCATION/TRAINING PROGRAM

## 2025-07-05 PROCEDURE — 11000001 HC ACUTE MED/SURG PRIVATE ROOM

## 2025-07-05 PROCEDURE — 25000003 PHARM REV CODE 250: Performed by: STUDENT IN AN ORGANIZED HEALTH CARE EDUCATION/TRAINING PROGRAM

## 2025-07-05 PROCEDURE — 27000207 HC ISOLATION

## 2025-07-05 PROCEDURE — 99233 SBSQ HOSP IP/OBS HIGH 50: CPT | Mod: ,,, | Performed by: FAMILY MEDICINE

## 2025-07-05 PROCEDURE — 25000003 PHARM REV CODE 250: Performed by: FAMILY MEDICINE

## 2025-07-05 PROCEDURE — 63600175 PHARM REV CODE 636 W HCPCS

## 2025-07-05 PROCEDURE — 80048 BASIC METABOLIC PNL TOTAL CA: CPT | Performed by: STUDENT IN AN ORGANIZED HEALTH CARE EDUCATION/TRAINING PROGRAM

## 2025-07-05 PROCEDURE — 63600175 PHARM REV CODE 636 W HCPCS: Performed by: STUDENT IN AN ORGANIZED HEALTH CARE EDUCATION/TRAINING PROGRAM

## 2025-07-05 PROCEDURE — 82962 GLUCOSE BLOOD TEST: CPT

## 2025-07-05 PROCEDURE — 63600175 PHARM REV CODE 636 W HCPCS: Performed by: FAMILY MEDICINE

## 2025-07-05 PROCEDURE — 94761 N-INVAS EAR/PLS OXIMETRY MLT: CPT

## 2025-07-05 PROCEDURE — 36415 COLL VENOUS BLD VENIPUNCTURE: CPT | Performed by: STUDENT IN AN ORGANIZED HEALTH CARE EDUCATION/TRAINING PROGRAM

## 2025-07-05 RX ORDER — INSULIN ASPART 100 [IU]/ML
17 INJECTION, SUSPENSION SUBCUTANEOUS 2 TIMES DAILY WITH MEALS
Status: DISCONTINUED | OUTPATIENT
Start: 2025-07-05 | End: 2025-07-07

## 2025-07-05 RX ADMIN — HYDROMORPHONE HYDROCHLORIDE 0.5 MG: 2 INJECTION INTRAMUSCULAR; INTRAVENOUS; SUBCUTANEOUS at 08:07

## 2025-07-05 RX ADMIN — Medication 6 MG: at 08:07

## 2025-07-05 RX ADMIN — CITALOPRAM HYDROBROMIDE 40 MG: 20 TABLET ORAL at 09:07

## 2025-07-05 RX ADMIN — HYDROMORPHONE HYDROCHLORIDE 0.5 MG: 2 INJECTION INTRAMUSCULAR; INTRAVENOUS; SUBCUTANEOUS at 09:07

## 2025-07-05 RX ADMIN — CALCIUM CARBONATE (ANTACID) CHEW TAB 500 MG 500 MG: 500 CHEW TAB at 09:07

## 2025-07-05 RX ADMIN — PIPERACILLIN SODIUM AND TAZOBACTAM SODIUM 4.5 G: 4; .5 INJECTION, POWDER, LYOPHILIZED, FOR SOLUTION INTRAVENOUS at 09:07

## 2025-07-05 RX ADMIN — INSULIN ASPART 2 UNITS: 100 INJECTION, SOLUTION INTRAVENOUS; SUBCUTANEOUS at 10:07

## 2025-07-05 RX ADMIN — MUPIROCIN: 20 OINTMENT TOPICAL at 10:07

## 2025-07-05 RX ADMIN — INSULIN ASPART 10 UNITS: 100 INJECTION, SOLUTION INTRAVENOUS; SUBCUTANEOUS at 12:07

## 2025-07-05 RX ADMIN — GABAPENTIN 400 MG: 400 CAPSULE ORAL at 09:07

## 2025-07-05 RX ADMIN — MUPIROCIN: 20 OINTMENT TOPICAL at 09:07

## 2025-07-05 RX ADMIN — OXYCODONE HYDROCHLORIDE AND ACETAMINOPHEN 1 TABLET: 5; 325 TABLET ORAL at 10:07

## 2025-07-05 RX ADMIN — INSULIN ASPART 15 UNITS: 100 INJECTION, SUSPENSION SUBCUTANEOUS at 09:07

## 2025-07-05 RX ADMIN — SODIUM CHLORIDE 1750 MG: 9 INJECTION, SOLUTION INTRAVENOUS at 11:07

## 2025-07-05 RX ADMIN — OXYCODONE HYDROCHLORIDE AND ACETAMINOPHEN 1 TABLET: 5; 325 TABLET ORAL at 06:07

## 2025-07-05 RX ADMIN — ENOXAPARIN SODIUM 40 MG: 40 INJECTION SUBCUTANEOUS at 04:07

## 2025-07-05 RX ADMIN — INSULIN ASPART 17 UNITS: 100 INJECTION, SUSPENSION SUBCUTANEOUS at 04:07

## 2025-07-05 RX ADMIN — GABAPENTIN 400 MG: 400 CAPSULE ORAL at 08:07

## 2025-07-05 RX ADMIN — CALCIUM CARBONATE (ANTACID) CHEW TAB 500 MG 500 MG: 500 CHEW TAB at 08:07

## 2025-07-05 RX ADMIN — OXYCODONE HYDROCHLORIDE AND ACETAMINOPHEN 1 TABLET: 5; 325 TABLET ORAL at 01:07

## 2025-07-05 RX ADMIN — SODIUM CHLORIDE 1750 MG: 9 INJECTION, SOLUTION INTRAVENOUS at 01:07

## 2025-07-05 RX ADMIN — PIPERACILLIN SODIUM AND TAZOBACTAM SODIUM 4.5 G: 4; .5 INJECTION, POWDER, LYOPHILIZED, FOR SOLUTION INTRAVENOUS at 04:07

## 2025-07-05 RX ADMIN — CALCIUM CARBONATE (ANTACID) CHEW TAB 500 MG 500 MG: 500 CHEW TAB at 01:07

## 2025-07-05 RX ADMIN — PIPERACILLIN SODIUM AND TAZOBACTAM SODIUM 4.5 G: 4; .5 INJECTION, POWDER, LYOPHILIZED, FOR SOLUTION INTRAVENOUS at 01:07

## 2025-07-05 RX ADMIN — PANTOPRAZOLE SODIUM 40 MG: 40 TABLET, DELAYED RELEASE ORAL at 09:07

## 2025-07-05 NOTE — PROGRESS NOTES
[facility]  [facility]  Progress Note    Patient Name: Elieser Hendricks  MRN: [id number]  Patient Class: IP- Inpatient   Admission Date: 7/2/2025  Length of Stay: 1 days  Attending Physician: Anitha Burger DO  Primary Care Provider: No primary care provider on file.        Subjective     Principal Problem:Osteomyelitis        HPI:  Mrs. Hendricks is a 41 yo female with pmh of t2dm, htn, adhd, anxiety/depression, and ptsd. Presented to the ED with complaints of worsening pain, redness, and swelling to bilateral great toes along with left hip wound.  Symptoms started approximately 3 weeks ago and have gradually worsened. Denies injury.  She reports that she's been following with Nurse Herman at [facility]. She's been taking clindamycin without any improvement.  She endorses nausea/vomiting and abdominal discomfort. Denies fever/chills, chest pain, shortness of breath, diarrhea, constipation.      Upon arrival to the ED, vital signs were stable. WBC normal.  Bilateral foot xray reveals soft tissue swelling in right great toe with questionable osteomyelitis of distal phalanx.  General surgery was consulted while patient was in ED.  She's being admitted to hospitalist service for further evaluation and treatment.     Overview/Hospital Course:  41 yo female with uncontrolled t2dm admitted with diabetic foot infection of bilateral great toes and left hip along with osteomyelitis of left great toe now s/p debridement. Continue with IV abx and wound care. Left hip wound culture remarkable for MRSA. Surgery following.  Will likely need 6 weeks of IV abx for treatment of left great toe osteomyelitis; however, patient is uninsured.  Will discuss further with surgery and .    7/5 - blood sugars still uncontrolled.  Novolog 70/30 increased to 17 units BID. Continue with iv abx. Await further recommendations from surgery.    Interval History: NAEO.    Objective:     Vital Signs  (Most Recent):  Temp: 97 °F (36.1 °C) (07/05/25 1135)  Pulse: 90 (07/05/25 1135)  Resp: 18 (07/05/25 1358)  BP: 109/69 (07/05/25 1135)  SpO2: (!) 94 % (07/05/25 1135) Vital Signs (24h Range):  Temp:  [97 °F (36.1 °C)-98.3 °F (36.8 °C)] 97 °F (36.1 °C)  Pulse:  [80-95] 90  Resp:  [16-18] 18  SpO2:  [94 %-98 %] 94 %  BP: ()/(52-91) 109/69     Weight: 86.2 kg (190 lb)  Body mass index is 24.39 kg/m².     Physical Exam  Constitutional:       Appearance: Normal appearance.   HENT:      Head: Normocephalic and atraumatic.      Nose: Nose normal.   Eyes:      Extraocular Movements: Extraocular movements intact.      Pupils: Pupils are equal, round, and reactive to light.   Cardiovascular:      Rate and Rhythm: Normal rate and regular rhythm.   Pulmonary:      Effort: Pulmonary effort is normal.      Breath sounds: Normal breath sounds.   Abdominal:      General: Bowel sounds are normal.      Palpations: Abdomen is soft.   Musculoskeletal:      Comments: Dressing noted to left hip and left foot.   Skin:     General: Skin is warm and dry.      Comments: Dressing noted to left hip and left foot.     Neurological:      General: No focal deficit present.      Mental Status: She is alert and oriented to person, place, and time.   Psychiatric:         Mood and Affect: Mood normal.         Behavior: Behavior normal.              CRANIAL NERVES     CN III, IV, VI   Pupils are equal, round, and reactive to light.       Significant Labs: All pertinent labs within the past 24 hours have been reviewed.    Significant Imaging: I have reviewed all pertinent imaging results/findings within the past 24 hours.  Review of Systems      Assessment & Plan  Osteomyelitis  Continue with zosyn/vancomycin IV.  Will likely need 6 weeks of IV abx; however, patient is uninsured. Will discuss further with surgery and case management.  Diabetic ulcer of left great toe  Patient's FSGs are uncontrolled due to hyperglycemia on current medication  "regimen.  Last A1c reviewed- No results found for: "LABA1C", "HGBA1C"  Most recent fingerstick glucose reviewed- No results for input(s): "POCTGLUCOSE" in the last 24 hours.  Current correctional scale  Medium  Maintain anti-hyperglycemic dose as follows-   Antihyperglycemics (From admission, onward)      Start     Stop Route Frequency Ordered    07/05/25 1645  insulin asp prt-insulin aspart (NovoLOG 70/30) injection 17 Units         -- SubQ 2 times daily with meals 07/05/25 0754    07/02/25 1721  insulin aspart U-100 injection 0-10 Units         -- SubQ Before meals & nightly PRN 07/02/25 1621          Hold Oral hypoglycemics while patient is in the hospital.    ----------    Continue with zosyn/vancomycin IV.  Surgery consulted.  S/p debridment of bilateral toes and left hip on 7/2.  Diabetic ulcer of right great toe  Patient's FSGs are uncontrolled due to hyperglycemia on current medication regimen.  Last A1c reviewed- No results found for: "LABA1C", "HGBA1C"  Most recent fingerstick glucose reviewed- No results for input(s): "POCTGLUCOSE" in the last 24 hours.  Current correctional scale  Medium  Maintain anti-hyperglycemic dose as follows-   Antihyperglycemics (From admission, onward)      Start     Stop Route Frequency Ordered    07/05/25 1645  insulin asp prt-insulin aspart (NovoLOG 70/30) injection 17 Units         -- SubQ 2 times daily with meals 07/05/25 0754    07/02/25 1721  insulin aspart U-100 injection 0-10 Units         -- SubQ Before meals & nightly PRN 07/02/25 1621          Hold Oral hypoglycemics while patient is in the hospital.    ---------------    Continue with zosyn/vancomycin IV.  Surgery consulted.  S/p debridment of bilateral toes and left hip on 7/2.  Decubitus ulcer of left hip, stage 2  Continue with zosyn/vancomycin IV.  Surgery consulted.  S/p debridment of bilateral toes and left hip on 7/2.  Wound culture remarkable for MRSA.    VTE Risk Mitigation (From admission, onward)          "  Ordered     enoxaparin injection 40 mg  Daily         07/02/25 1621     IP VTE HIGH RISK PATIENT  Once         07/02/25 1621     Place sequential compression device  Until discontinued         07/02/25 1621                    Discharge Planning   BISI: 7/7/2025     Code Status: Full Code   Medical Readiness for Discharge Date:   Discharge Plan A: Home with family                        Anitha Burger DO  Department of Hospital Medicine   [facility]

## 2025-07-05 NOTE — ASSESSMENT & PLAN NOTE
Continue with zosyn/vancomycin IV.  Will likely need 6 weeks of IV abx; however, patient is uninsured. Will discuss further with surgery and case management.

## 2025-07-05 NOTE — NURSING
Wound care complete. Bilateral great toe wound had small amount of drainage  redressed with wet to dry and wrapped in kerlex. Left hip wound repacked wet to dry and covered with mepilex.

## 2025-07-05 NOTE — ASSESSMENT & PLAN NOTE
"Patient's FSGs are uncontrolled due to hyperglycemia on current medication regimen.  Last A1c reviewed- No results found for: "LABA1C", "HGBA1C"  Most recent fingerstick glucose reviewed- No results for input(s): "POCTGLUCOSE" in the last 24 hours.  Current correctional scale  Medium  Maintain anti-hyperglycemic dose as follows-   Antihyperglycemics (From admission, onward)      Start     Stop Route Frequency Ordered    07/05/25 1645  insulin asp prt-insulin aspart (NovoLOG 70/30) injection 17 Units         -- SubQ 2 times daily with meals 07/05/25 0754    07/02/25 1721  insulin aspart U-100 injection 0-10 Units         -- SubQ Before meals & nightly PRN 07/02/25 1621          Hold Oral hypoglycemics while patient is in the hospital.    ----------    Continue with zosyn/vancomycin IV.  Surgery consulted.  S/p debridment of bilateral toes and left hip on 7/2.  "

## 2025-07-05 NOTE — ASSESSMENT & PLAN NOTE
"Patient's FSGs are uncontrolled due to hyperglycemia on current medication regimen.  Last A1c reviewed- No results found for: "LABA1C", "HGBA1C"  Most recent fingerstick glucose reviewed- No results for input(s): "POCTGLUCOSE" in the last 24 hours.  Current correctional scale  Medium  Maintain anti-hyperglycemic dose as follows-   Antihyperglycemics (From admission, onward)      Start     Stop Route Frequency Ordered    07/05/25 1645  insulin asp prt-insulin aspart (NovoLOG 70/30) injection 17 Units         -- SubQ 2 times daily with meals 07/05/25 0754    07/02/25 1721  insulin aspart U-100 injection 0-10 Units         -- SubQ Before meals & nightly PRN 07/02/25 1621          Hold Oral hypoglycemics while patient is in the hospital.    ---------------    Continue with zosyn/vancomycin IV.  Surgery consulted.  S/p debridment of bilateral toes and left hip on 7/2.  "

## 2025-07-05 NOTE — SUBJECTIVE & OBJECTIVE
Interval History: NAEO.    Objective:     Vital Signs (Most Recent):  Temp: 97 °F (36.1 °C) (07/05/25 1135)  Pulse: 90 (07/05/25 1135)  Resp: 18 (07/05/25 1358)  BP: 109/69 (07/05/25 1135)  SpO2: (!) 94 % (07/05/25 1135) Vital Signs (24h Range):  Temp:  [97 °F (36.1 °C)-98.3 °F (36.8 °C)] 97 °F (36.1 °C)  Pulse:  [80-95] 90  Resp:  [16-18] 18  SpO2:  [94 %-98 %] 94 %  BP: ()/(52-91) 109/69     Weight: 86.2 kg (190 lb)  Body mass index is 24.39 kg/m².     Physical Exam  Constitutional:       Appearance: Normal appearance.   HENT:      Head: Normocephalic and atraumatic.      Nose: Nose normal.   Eyes:      Extraocular Movements: Extraocular movements intact.      Pupils: Pupils are equal, round, and reactive to light.   Cardiovascular:      Rate and Rhythm: Normal rate and regular rhythm.   Pulmonary:      Effort: Pulmonary effort is normal.      Breath sounds: Normal breath sounds.   Abdominal:      General: Bowel sounds are normal.      Palpations: Abdomen is soft.   Musculoskeletal:      Comments: Dressing noted to left hip and left foot.   Skin:     General: Skin is warm and dry.      Comments: Dressing noted to left hip and left foot.     Neurological:      General: No focal deficit present.      Mental Status: She is alert and oriented to person, place, and time.   Psychiatric:         Mood and Affect: Mood normal.         Behavior: Behavior normal.              CRANIAL NERVES     CN III, IV, VI   Pupils are equal, round, and reactive to light.       Significant Labs: All pertinent labs within the past 24 hours have been reviewed.    Significant Imaging: I have reviewed all pertinent imaging results/findings within the past 24 hours.  Review of Systems

## 2025-07-05 NOTE — PLAN OF CARE
Problem: Adult Inpatient Plan of Care  Goal: Plan of Care Review  Outcome: Progressing  Goal: Patient-Specific Goal (Individualized)  Outcome: Progressing  Goal: Absence of Hospital-Acquired Illness or Injury  Outcome: Progressing  Goal: Readiness for Transition of Care  Outcome: Progressing     Problem: Diabetes Comorbidity  Goal: Blood Glucose Level Within Targeted Range  Outcome: Progressing     Problem: Wound  Goal: Optimal Coping  Outcome: Progressing  Goal: Optimal Functional Ability  Outcome: Progressing  Goal: Absence of Infection Signs and Symptoms  Outcome: Progressing  Goal: Improved Oral Intake  Outcome: Progressing  Goal: Optimal Pain Control and Function  Outcome: Progressing  Goal: Skin Health and Integrity  Outcome: Progressing  Goal: Optimal Wound Healing  Outcome: Progressing     Problem: Pain Acute  Goal: Optimal Pain Control and Function  Outcome: Progressing     Problem: Fall Injury Risk  Goal: Absence of Fall and Fall-Related Injury  Outcome: Progressing

## 2025-07-06 LAB
ANION GAP SERPL CALCULATED.3IONS-SCNC: 10 MMOL/L (ref 7–16)
BACTERIA SPEC ANAEROBE CULT: NORMAL
BASOPHILS # BLD AUTO: 0.05 K/UL (ref 0–0.2)
BASOPHILS NFR BLD AUTO: 1 % (ref 0–1)
BUN SERPL-MCNC: 10 MG/DL (ref 7–19)
BUN/CREAT SERPL: 11 (ref 6–20)
CALCIUM SERPL-MCNC: 8.8 MG/DL (ref 8.4–10.2)
CHLORIDE SERPL-SCNC: 107 MMOL/L (ref 98–107)
CO2 SERPL-SCNC: 26 MMOL/L (ref 22–29)
CREAT SERPL-MCNC: 0.87 MG/DL (ref 0.55–1.02)
DIFFERENTIAL METHOD BLD: ABNORMAL
EGFR (NO RACE VARIABLE) (RUSH/TITUS): 87 ML/MIN/1.73M2
EOSINOPHIL # BLD AUTO: 0.11 K/UL (ref 0–0.5)
EOSINOPHIL NFR BLD AUTO: 2.1 % (ref 1–4)
ERYTHROCYTE [DISTWIDTH] IN BLOOD BY AUTOMATED COUNT: 12.6 % (ref 11.5–14.5)
GLUCOSE SERPL-MCNC: 236 MG/DL (ref 70–105)
GLUCOSE SERPL-MCNC: 241 MG/DL (ref 70–105)
GLUCOSE SERPL-MCNC: 266 MG/DL (ref 70–105)
GLUCOSE SERPL-MCNC: 271 MG/DL (ref 74–100)
GLUCOSE SERPL-MCNC: 335 MG/DL (ref 70–105)
HCT VFR BLD AUTO: 29.7 % (ref 38–47)
HGB BLD-MCNC: 9.9 G/DL (ref 12–16)
IMM GRANULOCYTES # BLD AUTO: 0.01 K/UL (ref 0–0.04)
IMM GRANULOCYTES NFR BLD: 0.2 % (ref 0–0.4)
LYMPHOCYTES # BLD AUTO: 2.65 K/UL (ref 1–4.8)
LYMPHOCYTES NFR BLD AUTO: 50.7 % (ref 27–41)
MCH RBC QN AUTO: 27.4 PG (ref 27–31)
MCHC RBC AUTO-ENTMCNC: 33.3 G/DL (ref 32–36)
MCV RBC AUTO: 82.3 FL (ref 80–96)
MONOCYTES # BLD AUTO: 0.44 K/UL (ref 0–0.8)
MONOCYTES NFR BLD AUTO: 8.4 % (ref 2–6)
MPC BLD CALC-MCNC: 10.1 FL (ref 9.4–12.4)
NEUTROPHILS # BLD AUTO: 1.97 K/UL (ref 1.8–7.7)
NEUTROPHILS NFR BLD AUTO: 37.6 % (ref 53–65)
NRBC # BLD AUTO: 0 X10E3/UL
NRBC, AUTO (.00): 0 %
PLATELET # BLD AUTO: 323 K/UL (ref 150–400)
POTASSIUM SERPL-SCNC: 3.9 MMOL/L (ref 3.5–5.1)
RBC # BLD AUTO: 3.61 M/UL (ref 4.2–5.4)
SODIUM SERPL-SCNC: 139 MMOL/L (ref 136–145)
VANCOMYCIN TROUGH SERPL-MCNC: 24.1 ΜG/ML (ref 15–20)
WBC # BLD AUTO: 5.23 K/UL (ref 4.5–11)

## 2025-07-06 PROCEDURE — 80202 ASSAY OF VANCOMYCIN: CPT | Performed by: FAMILY MEDICINE

## 2025-07-06 PROCEDURE — 63600175 PHARM REV CODE 636 W HCPCS

## 2025-07-06 PROCEDURE — 36415 COLL VENOUS BLD VENIPUNCTURE: CPT | Performed by: STUDENT IN AN ORGANIZED HEALTH CARE EDUCATION/TRAINING PROGRAM

## 2025-07-06 PROCEDURE — 82962 GLUCOSE BLOOD TEST: CPT

## 2025-07-06 PROCEDURE — 25000003 PHARM REV CODE 250: Performed by: STUDENT IN AN ORGANIZED HEALTH CARE EDUCATION/TRAINING PROGRAM

## 2025-07-06 PROCEDURE — 63600175 PHARM REV CODE 636 W HCPCS: Performed by: STUDENT IN AN ORGANIZED HEALTH CARE EDUCATION/TRAINING PROGRAM

## 2025-07-06 PROCEDURE — 25000003 PHARM REV CODE 250: Performed by: FAMILY MEDICINE

## 2025-07-06 PROCEDURE — 63600175 PHARM REV CODE 636 W HCPCS: Performed by: FAMILY MEDICINE

## 2025-07-06 PROCEDURE — 80048 BASIC METABOLIC PNL TOTAL CA: CPT | Performed by: STUDENT IN AN ORGANIZED HEALTH CARE EDUCATION/TRAINING PROGRAM

## 2025-07-06 PROCEDURE — 85025 COMPLETE CBC W/AUTO DIFF WBC: CPT | Performed by: STUDENT IN AN ORGANIZED HEALTH CARE EDUCATION/TRAINING PROGRAM

## 2025-07-06 PROCEDURE — 11000001 HC ACUTE MED/SURG PRIVATE ROOM

## 2025-07-06 PROCEDURE — 99233 SBSQ HOSP IP/OBS HIGH 50: CPT | Mod: ,,, | Performed by: FAMILY MEDICINE

## 2025-07-06 PROCEDURE — 94761 N-INVAS EAR/PLS OXIMETRY MLT: CPT

## 2025-07-06 PROCEDURE — 27000207 HC ISOLATION

## 2025-07-06 RX ADMIN — PANTOPRAZOLE SODIUM 40 MG: 40 TABLET, DELAYED RELEASE ORAL at 08:07

## 2025-07-06 RX ADMIN — PIPERACILLIN SODIUM AND TAZOBACTAM SODIUM 4.5 G: 4; .5 INJECTION, POWDER, LYOPHILIZED, FOR SOLUTION INTRAVENOUS at 08:07

## 2025-07-06 RX ADMIN — CITALOPRAM HYDROBROMIDE 40 MG: 20 TABLET ORAL at 08:07

## 2025-07-06 RX ADMIN — HYDROMORPHONE HYDROCHLORIDE 0.5 MG: 2 INJECTION INTRAMUSCULAR; INTRAVENOUS; SUBCUTANEOUS at 11:07

## 2025-07-06 RX ADMIN — SODIUM CHLORIDE 1750 MG: 9 INJECTION, SOLUTION INTRAVENOUS at 09:07

## 2025-07-06 RX ADMIN — MUPIROCIN: 20 OINTMENT TOPICAL at 09:07

## 2025-07-06 RX ADMIN — CALCIUM CARBONATE (ANTACID) CHEW TAB 500 MG 500 MG: 500 CHEW TAB at 05:07

## 2025-07-06 RX ADMIN — GABAPENTIN 400 MG: 400 CAPSULE ORAL at 09:07

## 2025-07-06 RX ADMIN — CALCIUM CARBONATE (ANTACID) CHEW TAB 500 MG 500 MG: 500 CHEW TAB at 08:07

## 2025-07-06 RX ADMIN — PIPERACILLIN SODIUM AND TAZOBACTAM SODIUM 4.5 G: 4; .5 INJECTION, POWDER, LYOPHILIZED, FOR SOLUTION INTRAVENOUS at 05:07

## 2025-07-06 RX ADMIN — INSULIN ASPART 17 UNITS: 100 INJECTION, SUSPENSION SUBCUTANEOUS at 08:07

## 2025-07-06 RX ADMIN — HYDROMORPHONE HYDROCHLORIDE 0.5 MG: 2 INJECTION INTRAMUSCULAR; INTRAVENOUS; SUBCUTANEOUS at 09:07

## 2025-07-06 RX ADMIN — Medication 6 MG: at 09:07

## 2025-07-06 RX ADMIN — HYDROMORPHONE HYDROCHLORIDE 0.5 MG: 2 INJECTION INTRAMUSCULAR; INTRAVENOUS; SUBCUTANEOUS at 01:07

## 2025-07-06 RX ADMIN — CALCIUM CARBONATE (ANTACID) CHEW TAB 500 MG 500 MG: 500 CHEW TAB at 09:07

## 2025-07-06 RX ADMIN — INSULIN ASPART 2 UNITS: 100 INJECTION, SOLUTION INTRAVENOUS; SUBCUTANEOUS at 09:07

## 2025-07-06 RX ADMIN — ENOXAPARIN SODIUM 40 MG: 40 INJECTION SUBCUTANEOUS at 05:07

## 2025-07-06 RX ADMIN — PIPERACILLIN SODIUM AND TAZOBACTAM SODIUM 4.5 G: 4; .5 INJECTION, POWDER, LYOPHILIZED, FOR SOLUTION INTRAVENOUS at 01:07

## 2025-07-06 RX ADMIN — OXYCODONE HYDROCHLORIDE AND ACETAMINOPHEN 1 TABLET: 5; 325 TABLET ORAL at 06:07

## 2025-07-06 RX ADMIN — GABAPENTIN 400 MG: 400 CAPSULE ORAL at 08:07

## 2025-07-06 RX ADMIN — INSULIN ASPART 17 UNITS: 100 INJECTION, SUSPENSION SUBCUTANEOUS at 05:07

## 2025-07-06 RX ADMIN — MUPIROCIN: 20 OINTMENT TOPICAL at 08:07

## 2025-07-06 RX ADMIN — OXYCODONE HYDROCHLORIDE AND ACETAMINOPHEN 1 TABLET: 5; 325 TABLET ORAL at 08:07

## 2025-07-06 NOTE — PLAN OF CARE
Problem: Adult Inpatient Plan of Care  Goal: Plan of Care Review  7/6/2025 1635 by Lowell Molina RN  Outcome: Progressing  7/6/2025 1153 by Lowell Molina RN  Outcome: Progressing  Goal: Patient-Specific Goal (Individualized)  7/6/2025 1635 by Lowell Molina RN  Outcome: Progressing  7/6/2025 1153 by Lowell Molina RN  Outcome: Progressing  Goal: Absence of Hospital-Acquired Illness or Injury  7/6/2025 1635 by Lowell Molina RN  Outcome: Progressing  7/6/2025 1153 by Lowell Molina RN  Outcome: Progressing  Goal: Optimal Comfort and Wellbeing  7/6/2025 1635 by Lowell Molina RN  Outcome: Progressing  7/6/2025 1153 by Lowell Molina RN  Outcome: Progressing  Goal: Readiness for Transition of Care  7/6/2025 1635 by Lowell Molina RN  Outcome: Progressing  7/6/2025 1153 by Lowell Molina RN  Outcome: Progressing     Problem: Diabetes Comorbidity  Goal: Blood Glucose Level Within Targeted Range  7/6/2025 1635 by Lowell Molina RN  Outcome: Progressing  7/6/2025 1153 by Lowell Molina RN  Outcome: Progressing     Problem: Wound  Goal: Optimal Coping  7/6/2025 1635 by Lowell Molina RN  Outcome: Progressing  7/6/2025 1153 by Lowell Molina RN  Outcome: Progressing  Goal: Optimal Functional Ability  7/6/2025 1635 by Lowell Molina RN  Outcome: Progressing  7/6/2025 1153 by Lowell Molina RN  Outcome: Progressing  Goal: Absence of Infection Signs and Symptoms  7/6/2025 1635 by Lowell Molina RN  Outcome: Progressing  7/6/2025 1153 by Lowell Molina RN  Outcome: Progressing  Goal: Improved Oral Intake  7/6/2025 1635 by Lowell Molina RN  Outcome: Progressing  7/6/2025 1153 by Lowell Molina RN  Outcome: Progressing  Goal: Optimal Pain Control and Function  7/6/2025 1635 by Lowell Molina RN  Outcome: Progressing  7/6/2025 1153 by Bivins, Lowell, RN  Outcome: Progressing  Goal: Skin Health and Integrity  7/6/2025 1635 by Lowell Molina, RN  Outcome: Progressing  7/6/2025 1153 by Lowell Molina, RN  Outcome: Progressing  Goal: Optimal Wound  Healing  7/6/2025 1635 by Lowell Molina RN  Outcome: Progressing  7/6/2025 1153 by Lowell Molina RN  Outcome: Progressing     Problem: Pain Acute  Goal: Optimal Pain Control and Function  7/6/2025 1635 by Lowell Molina RN  Outcome: Progressing  7/6/2025 1153 by Lowell Molina RN  Outcome: Progressing     Problem: Fall Injury Risk  Goal: Absence of Fall and Fall-Related Injury  7/6/2025 1635 by Lowell Molina RN  Outcome: Progressing  7/6/2025 1153 by Lowell Molina RN  Outcome: Progressing

## 2025-07-06 NOTE — PROGRESS NOTES
Pharmacokinetic Assessment Follow Up: IV Vancomycin    Vancomycin serum concentration assessment(s):    The trough level was drawn correctly and can be used to guide therapy at this time. The measurement is above the desired definitive target range of 15 to 20 mcg/mL.    Vancomycin Regimen Plan:    Change regimen to Vancomycin 1750 mg IV every 24 hours with next serum trough concentration measured at 2130 prior to 3rd dose on 7/8    Drug levels (last 3 results):  Recent Labs   Lab Result Units 07/04/25  0951 07/06/25  0926   Vancomycin, Trough µg/mL 15.9 24.1*       Pharmacy will continue to follow and monitor vancomycin.    Please contact pharmacy at [phone] for questions regarding this assessment.    Thank you for the consult,   Zofia Aguirre       Patient brief summary:  Elieser Hendricks is a 40 y.o. female initiated on antimicrobial therapy with IV Vancomycin for treatment of bone/joint infection    The patient's current regimen is Vancomycin 1750mg IV q24hrs    Drug Allergies:   Review of patient's allergies indicates:  No Known Allergies    Actual Body Weight:   86.2kg    Renal Function:   Estimated Creatinine Clearance: 105.4 mL/min (based on SCr of 0.87 mg/dL).,     Dialysis Method (if applicable):  N/A    CBC (last 72 hours):  Recent Labs   Lab Result Units 07/04/25  0503 07/05/25  0402 07/06/25  0359   WBC K/uL 4.86 4.81 5.23   Hemoglobin g/dL 10.4* 10.2* 9.9*   Hematocrit % 30.8* 30.8* 29.7*   Platelet Count K/uL 359 357 323   Lymphocytes % % 58.4* 53.6* 50.7*   Monocytes % % 8.0* 7.1* 8.4*   Eosinophils % % 3.1 2.3 2.1   Basophils % % 0.6 0.8 1.0   Diff Type  Auto Auto Auto       Metabolic Panel (last 72 hours):  Recent Labs   Lab Result Units 07/04/25  0502 07/05/25  0402 07/06/25  0359   Sodium mmol/L 136 139 139   Potassium mmol/L 4.0 4.2 3.9   Chloride mmol/L 104 106 107   CO2 mmol/L 25 26 26   Glucose mg/dL 280* 325* 271*   BUN mg/dL 10 11 10   Creatinine mg/dL 0.73 1.00 0.87        Vancomycin Administrations:  vancomycin given in the last 96 hours                     vancomycin (VANCOCIN) 1,750 mg in 0.9% NaCl 500 mL IVPB (mg) 1,750 mg New Bag 07/05/25 2308     1,750 mg New Bag  1358     1,750 mg New Bag 07/04/25 2252     1,750 mg New Bag  1158      Restarted 07/03/25 2326      Restarted  2243     1,750 mg New Bag  2240      Restarted  1000     1,750 mg New Bag  0955     1,750 mg New Bag 07/02/25 2128                    Microbiologic Results:  Microbiology Results (last 7 days)       Procedure Component Value Units Date/Time    Culture, Anaerobe [[id number]] Collected: 07/02/25 1640    Order Status: Completed Specimen: Wound from Hip, Left Updated: 07/06/25 0746     Culture, Anaerobe No Anaerobes Isolated    Culture, Wound [[id number]]  (Abnormal)  (Susceptibility) Collected: 07/02/25 1640    Order Status: Completed Specimen: Wound from Hip, Left Updated: 07/04/25 0750     Culture, Wound/Abscess Heavy Growth Methicillin resistant Staphylococcus aureus

## 2025-07-06 NOTE — SUBJECTIVE & OBJECTIVE
Interval History: NAEO.    Objective:     Vital Signs (Most Recent):  Temp: 98 °F (36.7 °C) (07/06/25 1131)  Pulse: 75 (07/06/25 1131)  Resp: 18 (07/06/25 1131)  BP: 112/65 (07/06/25 1131)  SpO2: (!) 94 % (07/06/25 1131) Vital Signs (24h Range):  Temp:  [97 °F (36.1 °C)-98.3 °F (36.8 °C)] 98 °F (36.7 °C)  Pulse:  [75-85] 75  Resp:  [16-20] 18  SpO2:  [94 %-99 %] 94 %  BP: (112-152)/(65-91) 112/65     Weight: 86.2 kg (190 lb)  Body mass index is 24.39 kg/m².     Physical Exam  Constitutional:       Appearance: Normal appearance.   HENT:      Head: Normocephalic and atraumatic.      Nose: Nose normal.   Eyes:      Extraocular Movements: Extraocular movements intact.      Pupils: Pupils are equal, round, and reactive to light.   Cardiovascular:      Rate and Rhythm: Normal rate and regular rhythm.   Pulmonary:      Effort: Pulmonary effort is normal.      Breath sounds: Normal breath sounds.   Abdominal:      General: Bowel sounds are normal.      Palpations: Abdomen is soft.   Musculoskeletal:      Comments: Dressing noted to left hip and left foot.   Skin:     General: Skin is warm and dry.      Comments: Dressing noted to left hip and left foot.     Neurological:      General: No focal deficit present.      Mental Status: She is alert and oriented to person, place, and time.   Psychiatric:         Mood and Affect: Mood normal.         Behavior: Behavior normal.              CRANIAL NERVES     CN III, IV, VI   Pupils are equal, round, and reactive to light.       Significant Labs: All pertinent labs within the past 24 hours have been reviewed.    Significant Imaging: I have reviewed all pertinent imaging results/findings within the past 24 hours.  Review of Systems

## 2025-07-06 NOTE — PROGRESS NOTES
[facility]  [facility] Medicine  Progress Note    Patient Name: Elieser Hendricks  MRN: [id number]  Patient Class: IP- Inpatient   Admission Date: 7/2/2025  Length of Stay: 2 days  Attending Physician: Anitha Burger DO  Primary Care Provider: No primary care provider on file.        Subjective     Principal Problem:Osteomyelitis        HPI:  Mrs. Hendricks is a 41 yo female with pmh of t2dm, htn, adhd, anxiety/depression, and ptsd. Presented to the ED with complaints of worsening pain, redness, and swelling to bilateral great toes along with left hip wound.  Symptoms started approximately 3 weeks ago and have gradually worsened. Denies injury.  She reports that she's been following with Nurse Herman at [facility]. She's been taking clindamycin without any improvement.  She endorses nausea/vomiting and abdominal discomfort. Denies fever/chills, chest pain, shortness of breath, diarrhea, constipation.      Upon arrival to the ED, vital signs were stable. WBC normal.  Bilateral foot xray reveals soft tissue swelling in right great toe with questionable osteomyelitis of distal phalanx.  General surgery was consulted while patient was in ED.  She's being admitted to hospitalist service for further evaluation and treatment.     Overview/Hospital Course:  41 yo female with uncontrolled t2dm admitted with diabetic foot infection of bilateral great toes and left hip along with osteomyelitis of left great toe now s/p debridement. Continue with IV abx and wound care. Left hip wound culture remarkable for MRSA. Surgery following.  Will likely need 6 weeks of IV abx for treatment of left great toe osteomyelitis; however, patient is uninsured.  Will discuss further with surgery and .    7/5 - blood sugars still uncontrolled.  Novolog 70/30 increased to 17 units BID. Continue with iv abx. Await further recommendations from surgery.    7/6 - blood sugars still uncontrolled. Insulin increased.   Needs 6 wks of iv abx for treatment of left great toes osteomyelitis; however, patient is uninsured.  Will discuss further with surgery and case management.    Interval History: NAEO.    Objective:     Vital Signs (Most Recent):  Temp: 98 °F (36.7 °C) (07/06/25 1131)  Pulse: 75 (07/06/25 1131)  Resp: 18 (07/06/25 1131)  BP: 112/65 (07/06/25 1131)  SpO2: (!) 94 % (07/06/25 1131) Vital Signs (24h Range):  Temp:  [97 °F (36.1 °C)-98.3 °F (36.8 °C)] 98 °F (36.7 °C)  Pulse:  [75-85] 75  Resp:  [16-20] 18  SpO2:  [94 %-99 %] 94 %  BP: (112-152)/(65-91) 112/65     Weight: 86.2 kg (190 lb)  Body mass index is 24.39 kg/m².     Physical Exam  Constitutional:       Appearance: Normal appearance.   HENT:      Head: Normocephalic and atraumatic.      Nose: Nose normal.   Eyes:      Extraocular Movements: Extraocular movements intact.      Pupils: Pupils are equal, round, and reactive to light.   Cardiovascular:      Rate and Rhythm: Normal rate and regular rhythm.   Pulmonary:      Effort: Pulmonary effort is normal.      Breath sounds: Normal breath sounds.   Abdominal:      General: Bowel sounds are normal.      Palpations: Abdomen is soft.   Musculoskeletal:      Comments: Dressing noted to left hip and left foot.   Skin:     General: Skin is warm and dry.      Comments: Dressing noted to left hip and left foot.     Neurological:      General: No focal deficit present.      Mental Status: She is alert and oriented to person, place, and time.   Psychiatric:         Mood and Affect: Mood normal.         Behavior: Behavior normal.              CRANIAL NERVES     CN III, IV, VI   Pupils are equal, round, and reactive to light.       Significant Labs: All pertinent labs within the past 24 hours have been reviewed.    Significant Imaging: I have reviewed all pertinent imaging results/findings within the past 24 hours.  Review of Systems      Assessment & Plan  Osteomyelitis  Continue with zosyn/vancomycin IV.  Will likely need 6  "weeks of IV abx; however, patient is uninsured. Will discuss further with surgery and case management.  Diabetic ulcer of left great toe  Patient's FSGs are uncontrolled due to hyperglycemia on current medication regimen.  Last A1c reviewed- No results found for: "LABA1C", "HGBA1C"  Most recent fingerstick glucose reviewed- No results for input(s): "POCTGLUCOSE" in the last 24 hours.  Current correctional scale  Medium  Maintain anti-hyperglycemic dose as follows-   Antihyperglycemics (From admission, onward)      Start     Stop Route Frequency Ordered    07/05/25 1645  insulin asp prt-insulin aspart (NovoLOG 70/30) injection 17 Units         -- SubQ 2 times daily with meals 07/05/25 0754    07/02/25 1721  insulin aspart U-100 injection 0-10 Units         -- SubQ Before meals & nightly PRN 07/02/25 1621          Hold Oral hypoglycemics while patient is in the hospital.    ----------    Continue with zosyn/vancomycin IV.  Surgery consulted.  S/p debridment of bilateral toes and left hip on 7/2.  Diabetic ulcer of right great toe  Patient's FSGs are uncontrolled due to hyperglycemia on current medication regimen.  Last A1c reviewed- No results found for: "LABA1C", "HGBA1C"  Most recent fingerstick glucose reviewed- No results for input(s): "POCTGLUCOSE" in the last 24 hours.  Current correctional scale  Medium  Maintain anti-hyperglycemic dose as follows-   Antihyperglycemics (From admission, onward)      Start     Stop Route Frequency Ordered    07/05/25 1645  insulin asp prt-insulin aspart (NovoLOG 70/30) injection 17 Units         -- SubQ 2 times daily with meals 07/05/25 0754    07/02/25 1721  insulin aspart U-100 injection 0-10 Units         -- SubQ Before meals & nightly PRN 07/02/25 1621          Hold Oral hypoglycemics while patient is in the hospital.    ---------------    Continue with zosyn/vancomycin IV.  Surgery consulted.  S/p debridment of bilateral toes and left hip on 7/2.  Decubitus ulcer of left " hip, stage 2  Continue with zosyn/vancomycin IV.  Surgery consulted.  S/p debridment of bilateral toes and left hip on 7/2.  Wound culture remarkable for MRSA.    VTE Risk Mitigation (From admission, onward)           Ordered     enoxaparin injection 40 mg  Daily         07/02/25 1621     IP VTE HIGH RISK PATIENT  Once         07/02/25 1621     Place sequential compression device  Until discontinued         07/02/25 1621                    Discharge Planning   BISI: 7/7/2025     Code Status: Full Code   Medical Readiness for Discharge Date:   Discharge Plan A: Home with family                        Anitha Burger DO  Department of Hospital Medicine   [facility]

## 2025-07-07 VITALS
TEMPERATURE: 98 F | HEIGHT: 72 IN | SYSTOLIC BLOOD PRESSURE: 138 MMHG | BODY MASS INDEX: 25.73 KG/M2 | OXYGEN SATURATION: 98 % | DIASTOLIC BLOOD PRESSURE: 81 MMHG | WEIGHT: 190 LBS | HEART RATE: 89 BPM | RESPIRATION RATE: 18 BRPM

## 2025-07-07 PROBLEM — E11.621 DIABETIC ULCER OF RIGHT GREAT TOE: Status: ACTIVE | Noted: 2025-07-07

## 2025-07-07 PROBLEM — L97.519 DIABETIC ULCER OF RIGHT GREAT TOE: Status: ACTIVE | Noted: 2025-07-07

## 2025-07-07 PROBLEM — L02.91 ABSCESS: Status: ACTIVE | Noted: 2025-07-07

## 2025-07-07 PROBLEM — E11.65 UNCONTROLLED TYPE 2 DIABETES MELLITUS WITH HYPERGLYCEMIA: Status: ACTIVE | Noted: 2025-07-02

## 2025-07-07 LAB
ANION GAP SERPL CALCULATED.3IONS-SCNC: 11 MMOL/L (ref 7–16)
BASOPHILS # BLD AUTO: 0.04 K/UL (ref 0–0.2)
BASOPHILS NFR BLD AUTO: 0.8 % (ref 0–1)
BUN SERPL-MCNC: 9 MG/DL (ref 7–19)
BUN/CREAT SERPL: 10 (ref 6–20)
CALCIUM SERPL-MCNC: 9.1 MG/DL (ref 8.4–10.2)
CHLORIDE SERPL-SCNC: 108 MMOL/L (ref 98–107)
CO2 SERPL-SCNC: 26 MMOL/L (ref 22–29)
CREAT SERPL-MCNC: 0.86 MG/DL (ref 0.55–1.02)
DIFFERENTIAL METHOD BLD: ABNORMAL
EGFR (NO RACE VARIABLE) (RUSH/TITUS): 88 ML/MIN/1.73M2
EOSINOPHIL # BLD AUTO: 0.09 K/UL (ref 0–0.5)
EOSINOPHIL NFR BLD AUTO: 1.8 % (ref 1–4)
ERYTHROCYTE [DISTWIDTH] IN BLOOD BY AUTOMATED COUNT: 12.6 % (ref 11.5–14.5)
ESTROGEN SERPL-MCNC: NORMAL PG/ML
GLUCOSE SERPL-MCNC: 235 MG/DL (ref 70–105)
GLUCOSE SERPL-MCNC: 280 MG/DL (ref 74–100)
GLUCOSE SERPL-MCNC: 419 MG/DL (ref 70–105)
HCT VFR BLD AUTO: 28.9 % (ref 38–47)
HGB BLD-MCNC: 9.7 G/DL (ref 12–16)
IMM GRANULOCYTES # BLD AUTO: 0.01 K/UL (ref 0–0.04)
IMM GRANULOCYTES NFR BLD: 0.2 % (ref 0–0.4)
INSULIN SERPL-ACNC: NORMAL U[IU]/ML
LAB AP GROSS DESCRIPTION: NORMAL
LAB AP LABORATORY NOTES: NORMAL
LYMPHOCYTES # BLD AUTO: 2.5 K/UL (ref 1–4.8)
LYMPHOCYTES NFR BLD AUTO: 49.5 % (ref 27–41)
MCH RBC QN AUTO: 27.4 PG (ref 27–31)
MCHC RBC AUTO-ENTMCNC: 33.6 G/DL (ref 32–36)
MCV RBC AUTO: 81.6 FL (ref 80–96)
MONOCYTES # BLD AUTO: 0.43 K/UL (ref 0–0.8)
MONOCYTES NFR BLD AUTO: 8.5 % (ref 2–6)
MPC BLD CALC-MCNC: 10.4 FL (ref 9.4–12.4)
NEUTROPHILS # BLD AUTO: 1.98 K/UL (ref 1.8–7.7)
NEUTROPHILS NFR BLD AUTO: 39.2 % (ref 53–65)
NRBC # BLD AUTO: 0 X10E3/UL
NRBC, AUTO (.00): 0 %
PLATELET # BLD AUTO: 306 K/UL (ref 150–400)
POTASSIUM SERPL-SCNC: 3.7 MMOL/L (ref 3.5–5.1)
RBC # BLD AUTO: 3.54 M/UL (ref 4.2–5.4)
SODIUM SERPL-SCNC: 141 MMOL/L (ref 136–145)
T3RU NFR SERPL: NORMAL %
WBC # BLD AUTO: 5.05 K/UL (ref 4.5–11)

## 2025-07-07 PROCEDURE — 80048 BASIC METABOLIC PNL TOTAL CA: CPT | Performed by: STUDENT IN AN ORGANIZED HEALTH CARE EDUCATION/TRAINING PROGRAM

## 2025-07-07 PROCEDURE — 84460 ALANINE AMINO (ALT) (SGPT): CPT | Mod: ,,, | Performed by: CLINICAL MEDICAL LABORATORY

## 2025-07-07 PROCEDURE — 99900035 HC TECH TIME PER 15 MIN (STAT)

## 2025-07-07 PROCEDURE — 63600175 PHARM REV CODE 636 W HCPCS: Performed by: STUDENT IN AN ORGANIZED HEALTH CARE EDUCATION/TRAINING PROGRAM

## 2025-07-07 PROCEDURE — 94761 N-INVAS EAR/PLS OXIMETRY MLT: CPT

## 2025-07-07 PROCEDURE — 25000003 PHARM REV CODE 250: Performed by: STUDENT IN AN ORGANIZED HEALTH CARE EDUCATION/TRAINING PROGRAM

## 2025-07-07 PROCEDURE — 36415 COLL VENOUS BLD VENIPUNCTURE: CPT | Performed by: INTERNAL MEDICINE

## 2025-07-07 PROCEDURE — 25000003 PHARM REV CODE 250: Performed by: FAMILY MEDICINE

## 2025-07-07 PROCEDURE — 82962 GLUCOSE BLOOD TEST: CPT

## 2025-07-07 PROCEDURE — 84450 TRANSFERASE (AST) (SGOT): CPT | Mod: ,,, | Performed by: CLINICAL MEDICAL LABORATORY

## 2025-07-07 PROCEDURE — 83036 HEMOGLOBIN GLYCOSYLATED A1C: CPT | Mod: 90 | Performed by: INTERNAL MEDICINE

## 2025-07-07 PROCEDURE — 99239 HOSP IP/OBS DSCHRG MGMT >30: CPT | Mod: ,,, | Performed by: INTERNAL MEDICINE

## 2025-07-07 PROCEDURE — 85025 COMPLETE CBC W/AUTO DIFF WBC: CPT | Performed by: STUDENT IN AN ORGANIZED HEALTH CARE EDUCATION/TRAINING PROGRAM

## 2025-07-07 PROCEDURE — 36415 COLL VENOUS BLD VENIPUNCTURE: CPT | Performed by: STUDENT IN AN ORGANIZED HEALTH CARE EDUCATION/TRAINING PROGRAM

## 2025-07-07 RX ORDER — INSULIN ASPART 100 [IU]/ML
INJECTION, SUSPENSION SUBCUTANEOUS
Qty: 20 ML | Refills: 0 | Status: SHIPPED | OUTPATIENT
Start: 2025-07-07 | End: 2026-01-03

## 2025-07-07 RX ORDER — HEPARIN 100 UNIT/ML
500 SYRINGE INTRAVENOUS
Status: CANCELLED | OUTPATIENT
Start: 2025-07-08

## 2025-07-07 RX ORDER — OXYCODONE AND ACETAMINOPHEN 5; 325 MG/1; MG/1
1 TABLET ORAL EVERY 6 HOURS PRN
Qty: 15 TABLET | Refills: 0 | Status: SHIPPED | OUTPATIENT
Start: 2025-07-07

## 2025-07-07 RX ORDER — GABAPENTIN 400 MG/1
400 CAPSULE ORAL 2 TIMES DAILY
Qty: 60 CAPSULE | Refills: 0 | Status: SHIPPED | OUTPATIENT
Start: 2025-07-07 | End: 2025-08-06

## 2025-07-07 RX ORDER — SULFAMETHOXAZOLE AND TRIMETHOPRIM 800; 160 MG/1; MG/1
2 TABLET ORAL 2 TIMES DAILY
Qty: 40 TABLET | Refills: 0 | Status: SHIPPED | OUTPATIENT
Start: 2025-07-07 | End: 2025-07-17

## 2025-07-07 RX ORDER — METFORMIN HYDROCHLORIDE 500 MG/1
1000 TABLET ORAL 2 TIMES DAILY WITH MEALS
Qty: 360 TABLET | Refills: 0 | Status: SHIPPED | OUTPATIENT
Start: 2025-07-07 | End: 2025-10-05

## 2025-07-07 RX ORDER — INSULIN ASPART 100 [IU]/ML
20 INJECTION, SUSPENSION SUBCUTANEOUS 2 TIMES DAILY WITH MEALS
Status: DISCONTINUED | OUTPATIENT
Start: 2025-07-07 | End: 2025-07-07 | Stop reason: HOSPADM

## 2025-07-07 RX ORDER — HEPARIN 100 UNIT/ML
500 SYRINGE INTRAVENOUS
Status: CANCELLED | OUTPATIENT
Start: 2025-07-14

## 2025-07-07 RX ADMIN — CITALOPRAM HYDROBROMIDE 40 MG: 20 TABLET ORAL at 08:07

## 2025-07-07 RX ADMIN — INSULIN ASPART 10 UNITS: 100 INJECTION, SOLUTION INTRAVENOUS; SUBCUTANEOUS at 11:07

## 2025-07-07 RX ADMIN — OXYCODONE HYDROCHLORIDE AND ACETAMINOPHEN 1 TABLET: 5; 325 TABLET ORAL at 11:07

## 2025-07-07 RX ADMIN — PIPERACILLIN SODIUM AND TAZOBACTAM SODIUM 4.5 G: 4; .5 INJECTION, POWDER, LYOPHILIZED, FOR SOLUTION INTRAVENOUS at 01:07

## 2025-07-07 RX ADMIN — PANTOPRAZOLE SODIUM 40 MG: 40 TABLET, DELAYED RELEASE ORAL at 08:07

## 2025-07-07 RX ADMIN — HYDROMORPHONE HYDROCHLORIDE 0.5 MG: 2 INJECTION INTRAMUSCULAR; INTRAVENOUS; SUBCUTANEOUS at 08:07

## 2025-07-07 RX ADMIN — GABAPENTIN 400 MG: 400 CAPSULE ORAL at 08:07

## 2025-07-07 RX ADMIN — MUPIROCIN: 20 OINTMENT TOPICAL at 08:07

## 2025-07-07 NOTE — ASSESSMENT & PLAN NOTE
General surgery consulted; s/p debridment of bilateral toes and left hip on 7/2.  Per Op report: bilateral great toe wounds with toenails partially on attached; no bone exposure on bilateral great toes. Purulent drainage with epidermal and fat necrosis to left hip; cultures sent to microbiology.  Wound culture remarkable for MRSA.   Plan for Dalvance x 2 doses (one week apart) and PO Chris trim x 10 days with OP follow up and wound care.   Pharmacy assisting in arranging Dalvance infusion.

## 2025-07-07 NOTE — DISCHARGE SUMMARY
[facility]  [place] [facility] Medicine  Discharge Summary      Patient Name: Elieser Hendricks  MRN: [id number]  [facility]: [id number]  Patient Class: IP- Inpatient  Admission Date: 7/2/2025  Hospital Length of Stay: 3 days  Discharge Date and Time: 07/07/2025 2:16 PM  Attending Physician: Willy Cruz MD   Discharging Provider: WILLY CRUZ MD  Primary Care Provider: No primary care provider on file.    Primary Care Team: Networked reference to record PCT     HPI:   Mrs. Hendricks is a 39 yo female with pmh of t2dm, htn, adhd, anxiety/depression, and ptsd. Presented to the ED with complaints of worsening pain, redness, and swelling to bilateral great toes along with left hip wound.  Symptoms started approximately 3 weeks ago and have gradually worsened. Denies injury.  She reports that she's been following with Nurse Herman at [facility]. She's been taking clindamycin without any improvement.  She endorses nausea/vomiting and abdominal discomfort. Denies fever/chills, chest pain, shortness of breath, diarrhea, constipation.      Upon arrival to the ED, vital signs were stable. WBC normal.  Bilateral foot xray reveals soft tissue swelling in right great toe with questionable osteomyelitis of distal phalanx.  General surgery was consulted while patient was in ED.  She's being admitted to hospitalist service for further evaluation and treatment.     Procedure(s) (LRB):  DEBRIDEMENT, FOOT (Bilateral)  DEBRIDEMENT, WOUND (Left)      Hospital Course:   39 yo female with uncontrolled t2dm admitted with diabetic foot infection of bilateral great toes and left hip along with osteomyelitis of left great toe now s/p debridement. Continue with IV abx and wound care. Left hip wound culture remarkable for MRSA. Surgery following.  Will likely need 6 weeks of IV abx for treatment of left great toe osteomyelitis; however, patient is uninsured.  Will discuss further with surgery and .    7/5 -  "blood sugars still uncontrolled.  Novolog 70/30 increased to 17 units BID. Continue with iv abx. Await further recommendations from surgery.    7/6 - blood sugars still uncontrolled. Insulin increased.  Needs 6 wks of iv abx for treatment of left great toes osteomyelitis; however, patient is uninsured.  Will discuss further with surgery and case management.    7/7- D/W surgery and they recommend Dalvance x 2 doses (1 week apart) and PO Bactrim. OP infusion appointment set up. Stable for discharge home today.      Goals of Care Treatment Preferences:  Code Status: Full Code         Consults:   Consults (From admission, onward)          Status Ordering Provider     Inpatient consult to Diabetes educator  Once        Provider:  (Not yet assigned)    Completed VERONICA SMITH     Pharmacy to dose Vancomycin consult  Once        Provider:  (Not yet assigned)   Placed in "And" Linked Group    Acknowledged COLT ALFRED            Assessment & Plan  Decubitus ulcer of left hip, stage 2  Diabetic ulcer of left great toe  Diabetic ulcer of right great toe              General surgery consulted; s/p debridment of bilateral toes and left hip on 7/2.  Per Op report: bilateral great toe wounds with toenails partially on attached; no bone exposure on bilateral great toes. Purulent drainage with epidermal and fat necrosis to left hip; cultures sent to microbiology.  Wound culture remarkable for MRSA.   Plan for Dalvance x 2 doses (one week apart) and PO Chris trim x 10 days with OP follow up and wound care.   Pharmacy assisting in arranging Dalvance infusion.     Uncontrolled type 2 diabetes mellitus with hyperglycemia  Patient's FSGs are uncontrolled due to hyperglycemia on current medication regimen.  Last A1c reviewed- No results found for: "LABA1C", "HGBA1C"  Most recent fingerstick glucose reviewed- No results for input(s): "POCTGLUCOSE" in the last 24 hours.  Current correctional scale  Medium  Maintain anti-hyperglycemic " "dose as follows-   Antihyperglycemics (From admission, onward)      Start     Stop Route Frequency Ordered    07/07/25 1645  insulin asp prt-insulin aspart (NovoLOG 70/30) injection 20 Units         -- SubQ 2 times daily with meals 07/07/25 0825    07/02/25 1721  insulin aspart U-100 injection 0-10 Units         -- SubQ Before meals & nightly PRN 07/02/25 1621          Hold Oral hypoglycemics while patient is in the hospital.  Diabetes educator consulted.  Close PCP follow up.     Final Active Diagnoses:    Diagnosis Date Noted POA    PRINCIPAL PROBLEM:  Osteomyelitis [M86.9] 07/02/2025 Yes    Abscess [L02.91] 07/07/2025 Yes    Diabetic ulcer of left great toe [E11.621, L97.529] 07/02/2025 Yes    Diabetic ulcer of right great toe [E11.621, L97.519] 07/02/2025 Yes    Decubitus ulcer of left hip, stage 2 [L89.222] 07/02/2025 Yes      Problems Resolved During this Admission:       Discharged Condition: fair    Disposition: Home or Self Care    Follow Up:   Follow-up Information       Clinic, [facility]. Schedule an appointment as soon as possible for a visit in 1 week(s).    Why: hospital follow up  Contact information:  [address] [place] [address] [place] [address]  [phone]                           Patient Instructions:   No discharge procedures on file.    Significant Diagnostic Studies: Labs: BMP:   Recent Labs   Lab 07/06/25  0359 07/07/25  0420   * 280*    141   K 3.9 3.7    108*   CO2 26 26   BUN 10 9   CREATININE 0.87 0.86   CALCIUM 8.8 9.1   , CMP   Recent Labs   Lab 07/06/25  0359 07/07/25  0420    141   K 3.9 3.7    108*   CO2 26 26   * 280*   BUN 10 9   CREATININE 0.87 0.86   CALCIUM 8.8 9.1   ANIONGAP 10 11   , CBC   Recent Labs   Lab 07/06/25  0359 07/07/25  0420   WBC 5.23 5.05   HGB 9.9* 9.7*   HCT 29.7* 28.9*    306   , and A1C: No results for input(s): "HGBA1C" in the last 4320 hours.    Pending Diagnostic Studies:       Procedure Component Value " Units Date/Time    Hemoglobin A1C ([place]) [[id number]] Collected: 07/07/25 0456    Order Status: Sent Lab Status: In process Updated: 07/07/25 1309    Specimen: Blood            Medications:  Reconciled Home Medications:      Medication List        START taking these medications      gabapentin 400 MG capsule  Commonly known as: NEURONTIN  Take 1 capsule (400 mg total) by mouth 2 (two) times daily.     oxyCODONE-acetaminophen 5-325 mg per tablet  Commonly known as: PERCOCET  Take 1 tablet by mouth every 6 (six) hours as needed for Pain.     sulfamethoxazole-trimethoprim 800-160mg 800-160 mg Tab  Commonly known as: BACTRIM DS  Take 2 tablets by mouth 2 (two) times daily. for 10 days for antibiotic            CHANGE how you take these medications      insulin asp prt-insulin aspart (NovoLOG 70/30) 100 unit/mL (70-30) Soln  Commonly known as: NovoLOG 70/30  INJECT 10 UNITS UNDER THE SKIN IN THE MORNING & 14 UNITS AT NIGHT EVERY DAY (TWICE A DAY).  Start taking on: July 7, 2025  What changed: See the new instructions.            CONTINUE taking these medications      dextroamphetamine-amphetamine 30 MG 24 hr capsule  Commonly known as: ADDERALL XR  Take 30 mg by mouth every morning.     metFORMIN 500 MG tablet  Commonly known as: GLUCOPHAGE  Take 2 tablets (1,000 mg total) by mouth 2 (two) times daily with meals.            STOP taking these medications      clindamycin 300 MG capsule  Commonly known as: CLEOCIN     temazepam 30 mg capsule  Commonly known as: RESTORIL              Indwelling Lines/Drains at time of discharge:   Lines/Drains/Airways       None                       Time spent on the discharge of patient: 45 minutes         WILLY PRAKASH MD  Department of Hospital Medicine  [facility]

## 2025-07-07 NOTE — DISCHARGE INSTRUCTIONS
Hospitalist Discharge orders  *Notify your healthcare provider if you experience any of the following: temperature >100.4  *Notify your healthcare provider if you experience any of the following: redness, tenderness, or any signs or symptoms of infection (pain, swelling, redness, odor or green/yellow drainage around incision site).  *Notify your healthcare provider if you experience any of the following: difficulty breathing or increased cough.  *Notify your physician if you experience any persistent nausea, vomiting, diarrhea or headache.  *Notify your physician if you experience any of the following: severe uncontrolled pain, worsening rash, increased confusion, weakness, dizziness, lightheadedness, or visual disturbances.

## 2025-07-07 NOTE — ASSESSMENT & PLAN NOTE
"Patient's FSGs are uncontrolled due to hyperglycemia on current medication regimen.  Last A1c reviewed- No results found for: "LABA1C", "HGBA1C"  Most recent fingerstick glucose reviewed- No results for input(s): "POCTGLUCOSE" in the last 24 hours.  Current correctional scale  Medium  Maintain anti-hyperglycemic dose as follows-   Antihyperglycemics (From admission, onward)      Start     Stop Route Frequency Ordered    07/07/25 1645  insulin asp prt-insulin aspart (NovoLOG 70/30) injection 20 Units         -- SubQ 2 times daily with meals 07/07/25 0825    07/02/25 1721  insulin aspart U-100 injection 0-10 Units         -- SubQ Before meals & nightly PRN 07/02/25 1621          Hold Oral hypoglycemics while patient is in the hospital.  Diabetes educator consulted.  Close PCP follow up.     "

## 2025-07-07 NOTE — CONSULTS
[facility]  Diabetes Education  Consult Note    Author: ETHAN BARRERA RN  Date: 7/7/2025    Glucose past 3 days recorded:   Recent Labs   Lab 07/07/25  0420   *     Last A1c: 13.3 on 6/23/2025 (Done at [facility]) Note found in Epic EMR.     Last Visit with Diabetes Educator: Spoke with patient and she was diagnosed with diabetes Type 2, fifteen years ago. States she's never really taken it serious  or followed up regularly with a HCP.  We discussed the benefits of Outpatient Diabetes Care and Education and she was agreeable. I took patient a diabetes teaching package  / with my contact information and left it at the [facility] with her nurse.  I plan to follow up with  her HCP about  ordering DSME/S.       Health Maintenance Topics with due status: Not Due       Topic Last Completion Date    TETANUS VACCINE 10/18/2022    Influenza Vaccine Not Due    RSV Vaccine (Age 60+ and Pregnant patients) Not Due     Health Maintenance Due   Topic Date Due    Hepatitis C Screening  Never done    Hemoglobin A1c  Never done    Diabetes Urine Screening  Never done    Foot Exam  Never done    Diabetic Eye Exam  Never done    HIV Screening  Never done    Mammogram  Never done    Pneumococcal Vaccines (Age 0-49) (1 of 2 - PCV) Never done    Low Dose Statin  Never done    Lipid Panel  08/10/2017    Cervical Cancer Screening  04/22/2018    COVID-19 Vaccine (3 - 2024-25 season) 09/01/2024       Today's Self-Management Care Plan was developed with the patient's input and is based on barriers identified during today's assessment.    Written information on health maintenance  provided. The  importance of routine eye exams, foot exams/foot care, blood work (i.e.: A1c, microalbumin, and lipid), dental visits, yearly flu vaccine, and pneumonia vaccine as indicated by PCP.     The patient  verbalized understanding of all of today's instructions.       The patient was encouraged to  communicate with her physician and care team regarding her condition(s) and treatment.  I provided the patient with my contact information today and encouraged her to contact me via phone or patient portal as needed.

## 2025-07-07 NOTE — PLAN OF CARE
Talked with Allie in financial assistance regarding patient's need for 6 weeks of antibiotics and no insurance.  She stated that patient had been denied financial assistance on 7/2 for being over income.  She stated she will re-visit patient to determine correct income and/or determine if patient would like to file dispute/appeal.  Will continue to follow for anticipated dc needs.

## 2025-07-07 NOTE — PROGRESS NOTES
Patient was evaluated by Dr Yao this morning. Dressing changed per Dr Yao today.  Will consult diabetic education to see patient this admission.  Plan for Dalvance 1500mg while in hospital and then 2nd dose in 7 days per infusion center.  Patient will discharge home with Bactrim po x 10 days. Will need daily dressing changes.  Will clean wounds with Vashe, and repack with wet to dry dressings.  She will follow up with general surgery in 2 weeks for recheck.

## 2025-07-07 NOTE — PLAN OF CARE
[facility]  Discharge Final Note    Primary Care Provider: No primary care provider on file.    Expected Discharge Date: 7/7/2025    Final Discharge Note (most recent)       Final Note - 07/07/25 1549          Final Note    Assessment Type Final Discharge Note     Anticipated Discharge Disposition Home or Self Care        Post-Acute Status    Discharge Delays None known at this time                     Important Message from Medicare             Contact Info       [address] [address]   Phone: [phone]       Next Steps: Schedule an appointment as soon as possible for a visit in 1 week(s)    Instructions: hospital follow up. [facility]. Patient to go to clinic on July 14 2025          [facility]  Discharge Final Note    Primary Care Provider: No primary care provider on file.    Expected Discharge Date: 7/7/2025    Final Discharge Note (most recent)       Final Note - 07/07/25 1439          Final Note    Assessment Type Final Discharge Note     Anticipated Discharge Disposition Home or Self Care        Post-Acute Status    Discharge Delays None known at this time                     Important Message from Medicare             Contact [employer]       [address] [address]   Phone: [phone]       Next Steps: Schedule an appointment as soon as possible for a visit in 1 week(s)    Instructions: hospital follow up        Patient to be dc today to return home to live with .  She does not have hh or dme.  She will have op infusion in one week as determined by medical staff.  No further needs noted.

## 2025-07-07 NOTE — PLAN OF CARE
Seiling that patient will not require 6 weeks of IV antibiotics after all but will require one dose post dc @ outpatient infusion.  Patient reports she can do self-pay for medication.  Arrangements being made via medical staff.  Will f/u as needed.    1411 Received call from Allie in financial assistance.  She stated that patient intends to reapply for financial assistance.  Will f/u as needed.

## 2025-07-07 NOTE — NURSING
Discharge instructions reviewed with patient and spouse; and copy given to patient. Patient and spouse voiced understanding regarding:meds, appt., WET TO DRY DRESSING changes signs and symptoms to report to physician.   Hospitalist Discharge orders  *Notify your healthcare provider if you experience any of the following: temperature >100.4  *Notify your healthcare provider if you experience any of the following: redness, tenderness, or any signs or symptoms of infection (pain, swelling, redness, odor or green/yellow drainage around incision site).  *Notify your healthcare provider if you experience any of the following: difficulty breathing or increased cough.  *Notify your physician if you experience any persistent nausea, vomiting, diarrhea or headache.  *Notify your physician if you experience any of the following: severe uncontrolled pain, worsening rash, increased confusion, weakness, dizziness, lightheadedness, or visual disturbances.

## 2025-07-08 ENCOUNTER — INFUSION (OUTPATIENT)
Dept: INFUSION THERAPY | Facility: HOSPITAL | Age: 40
End: 2025-07-08
Attending: INTERNAL MEDICINE

## 2025-07-08 VITALS
TEMPERATURE: 98 F | DIASTOLIC BLOOD PRESSURE: 85 MMHG | OXYGEN SATURATION: 96 % | RESPIRATION RATE: 17 BRPM | SYSTOLIC BLOOD PRESSURE: 133 MMHG | HEART RATE: 80 BPM

## 2025-07-08 DIAGNOSIS — L02.91 ABSCESS: Primary | ICD-10-CM

## 2025-07-08 LAB
ALT SERPL W P-5'-P-CCNC: 13 U/L
AST SERPL W P-5'-P-CCNC: 19 U/L (ref 11–45)
BILIRUB DIRECT SERPL-MCNC: 0.2 MG/DL
HBA1C MFR BLD: 13.2 % (ref 4–5.6)

## 2025-07-08 PROCEDURE — 82248 BILIRUBIN DIRECT: CPT | Performed by: INTERNAL MEDICINE

## 2025-07-08 PROCEDURE — 25000003 PHARM REV CODE 250: Performed by: INTERNAL MEDICINE

## 2025-07-08 PROCEDURE — 96365 THER/PROPH/DIAG IV INF INIT: CPT

## 2025-07-08 PROCEDURE — 63600175 PHARM REV CODE 636 W HCPCS: Mod: JZ,TB | Performed by: INTERNAL MEDICINE

## 2025-07-08 RX ORDER — HEPARIN 100 UNIT/ML
500 SYRINGE INTRAVENOUS
OUTPATIENT
Start: 2025-07-15

## 2025-07-08 RX ORDER — HEPARIN 100 UNIT/ML
500 SYRINGE INTRAVENOUS
Status: DISCONTINUED | OUTPATIENT
Start: 2025-07-08 | End: 2025-07-08 | Stop reason: HOSPADM

## 2025-07-08 RX ADMIN — DALBAVANCIN 1500 MG: 500 INJECTION, POWDER, FOR SOLUTION INTRAVENOUS at 09:07

## 2025-07-08 NOTE — PROGRESS NOTES
0930 Pt here for Dalvance infusion, resting in recliner, TP released and pharmacy notified    Pt is on drug replacement program.  Dalvance medication handout given and reviewed  0954 Dalvance infusion started to infuse over 30 min  1026 Dalvance infusion complete, tolerated well, will continue to monitor  1045 pt discharged ambulatory with no adverse reaction noted, pt notified to go to ER with any adverse reactions, AVS given along with medication information  Follow up appt made 1 week

## 2025-07-15 ENCOUNTER — INFUSION (OUTPATIENT)
Dept: INFUSION THERAPY | Facility: HOSPITAL | Age: 40
End: 2025-07-15
Attending: INTERNAL MEDICINE

## 2025-07-15 VITALS
HEART RATE: 94 BPM | RESPIRATION RATE: 17 BRPM | DIASTOLIC BLOOD PRESSURE: 92 MMHG | SYSTOLIC BLOOD PRESSURE: 142 MMHG | OXYGEN SATURATION: 95 %

## 2025-07-15 DIAGNOSIS — L02.91 ABSCESS: Primary | ICD-10-CM

## 2025-07-15 PROCEDURE — 96365 THER/PROPH/DIAG IV INF INIT: CPT

## 2025-07-15 PROCEDURE — 25000003 PHARM REV CODE 250: Performed by: INTERNAL MEDICINE

## 2025-07-15 PROCEDURE — 63600175 PHARM REV CODE 636 W HCPCS: Mod: JZ,TB | Performed by: INTERNAL MEDICINE

## 2025-07-15 RX ORDER — HEPARIN 100 UNIT/ML
500 SYRINGE INTRAVENOUS
Status: CANCELLED | OUTPATIENT
Start: 2025-07-15

## 2025-07-15 RX ORDER — HEPARIN 100 UNIT/ML
500 SYRINGE INTRAVENOUS
Status: DISCONTINUED | OUTPATIENT
Start: 2025-07-15 | End: 2025-07-15

## 2025-07-15 RX ADMIN — DALBAVANCIN 1500 MG: 500 INJECTION, POWDER, FOR SOLUTION INTRAVENOUS at 10:07

## 2025-07-15 NOTE — PROGRESS NOTES
1000  Pt here for Dalvance infusion, resting in recliner, TP released and pharmacy notified   1049 Dalvance infusion started to infuse over 30 min  Care transferred to PRASANNA Emery RN

## 2025-07-15 NOTE — PROGRESS NOTES
Received pt from JESSICA Sinclair RN, Dalvance infusing.   1119 Dalvance infusion complete, will monitor.   1140 Pt discharged, ambulatory, no adverse reaction noted. Notified to go to the ER with any adverse reactions. AVS given, pt verbalized understanding.   Therapy plan complete.

## 2025-07-24 ENCOUNTER — PATIENT MESSAGE (OUTPATIENT)
Dept: SURGERY | Facility: CLINIC | Age: 40
End: 2025-07-24

## 2025-08-15 ENCOUNTER — HOSPITAL ENCOUNTER (EMERGENCY)
Facility: HOSPITAL | Age: 40
Discharge: HOME | End: 2025-08-15

## 2025-08-15 VITALS
TEMPERATURE: 98 F | SYSTOLIC BLOOD PRESSURE: 128 MMHG | WEIGHT: 190 LBS | HEIGHT: 72 IN | DIASTOLIC BLOOD PRESSURE: 75 MMHG | RESPIRATION RATE: 18 BRPM | OXYGEN SATURATION: 98 % | HEART RATE: 113 BPM | BODY MASS INDEX: 25.73 KG/M2

## 2025-08-15 DIAGNOSIS — S91.309A WOUND OF FOOT: Primary | ICD-10-CM

## 2025-08-15 DIAGNOSIS — S91.309A WOUND OF FOOT: ICD-10-CM

## 2025-08-15 LAB
ANION GAP SERPL CALCULATED.3IONS-SCNC: 15 MMOL/L (ref 7–16)
BASOPHILS # BLD AUTO: 0.04 K/UL (ref 0–0.2)
BASOPHILS NFR BLD AUTO: 1 % (ref 0–1)
BUN SERPL-MCNC: 21 MG/DL (ref 7–19)
BUN/CREAT SERPL: 19 (ref 6–20)
CALCIUM SERPL-MCNC: 9.5 MG/DL (ref 8.4–10.2)
CHLORIDE SERPL-SCNC: 99 MMOL/L (ref 98–107)
CO2 SERPL-SCNC: 22 MMOL/L (ref 22–29)
CREAT SERPL-MCNC: 1.13 MG/DL (ref 0.55–1.02)
DIFFERENTIAL METHOD BLD: ABNORMAL
EGFR (NO RACE VARIABLE) (RUSH/TITUS): 63 ML/MIN/1.73M2
EOSINOPHIL # BLD AUTO: 0.16 K/UL (ref 0–0.5)
EOSINOPHIL NFR BLD AUTO: 4.2 % (ref 1–4)
ERYTHROCYTE [DISTWIDTH] IN BLOOD BY AUTOMATED COUNT: 13.2 % (ref 11.5–14.5)
GLUCOSE SERPL-MCNC: 382 MG/DL (ref 74–100)
HCT VFR BLD AUTO: 32 % (ref 38–47)
HGB BLD-MCNC: 11 G/DL (ref 12–16)
IMM GRANULOCYTES # BLD AUTO: 0.01 K/UL (ref 0–0.04)
IMM GRANULOCYTES NFR BLD: 0.3 % (ref 0–0.4)
LACTATE SERPL-SCNC: 2.1 MMOL/L (ref 0.5–2.2)
LYMPHOCYTES # BLD AUTO: 2.03 K/UL (ref 1–4.8)
LYMPHOCYTES NFR BLD AUTO: 53 % (ref 27–41)
MCH RBC QN AUTO: 27.6 PG (ref 27–31)
MCHC RBC AUTO-ENTMCNC: 34.4 G/DL (ref 32–36)
MCV RBC AUTO: 80.4 FL (ref 80–96)
MONOCYTES # BLD AUTO: 0.45 K/UL (ref 0–0.8)
MONOCYTES NFR BLD AUTO: 11.7 % (ref 2–6)
MPC BLD CALC-MCNC: 9.6 FL (ref 9.4–12.4)
NEUTROPHILS # BLD AUTO: 1.14 K/UL (ref 1.8–7.7)
NEUTROPHILS NFR BLD AUTO: 29.8 % (ref 53–65)
NRBC # BLD AUTO: 0 X10E3/UL
NRBC, AUTO (.00): 0 %
PLATELET # BLD AUTO: 284 K/UL (ref 150–400)
POTASSIUM SERPL-SCNC: 4.6 MMOL/L (ref 3.5–5.1)
RBC # BLD AUTO: 3.98 M/UL (ref 4.2–5.4)
SODIUM SERPL-SCNC: 131 MMOL/L (ref 136–145)
WBC # BLD AUTO: 3.83 K/UL (ref 4.5–11)

## 2025-08-15 PROCEDURE — 36415 COLL VENOUS BLD VENIPUNCTURE: CPT | Performed by: NURSE PRACTITIONER

## 2025-08-15 PROCEDURE — 25000003 PHARM REV CODE 250: Performed by: NURSE PRACTITIONER

## 2025-08-15 PROCEDURE — 96361 HYDRATE IV INFUSION ADD-ON: CPT

## 2025-08-15 PROCEDURE — 63600175 PHARM REV CODE 636 W HCPCS: Performed by: NURSE PRACTITIONER

## 2025-08-15 PROCEDURE — 85025 COMPLETE CBC W/AUTO DIFF WBC: CPT | Performed by: NURSE PRACTITIONER

## 2025-08-15 PROCEDURE — 96375 TX/PRO/DX INJ NEW DRUG ADDON: CPT

## 2025-08-15 PROCEDURE — 80048 BASIC METABOLIC PNL TOTAL CA: CPT | Performed by: NURSE PRACTITIONER

## 2025-08-15 PROCEDURE — 96374 THER/PROPH/DIAG INJ IV PUSH: CPT

## 2025-08-15 PROCEDURE — 83605 ASSAY OF LACTIC ACID: CPT | Performed by: NURSE PRACTITIONER

## 2025-08-15 PROCEDURE — 99284 EMERGENCY DEPT VISIT MOD MDM: CPT | Mod: 25

## 2025-08-15 RX ORDER — ACETAMINOPHEN AND CODEINE PHOSPHATE 300; 30 MG/1; MG/1
1 TABLET ORAL EVERY 6 HOURS PRN
Qty: 20 TABLET | Refills: 0 | Status: SHIPPED | OUTPATIENT
Start: 2025-08-15 | End: 2025-08-19 | Stop reason: SDUPTHER

## 2025-08-15 RX ORDER — CLINDAMYCIN PHOSPHATE 900 MG/50ML
900 INJECTION, SOLUTION INTRAVENOUS
Status: COMPLETED | OUTPATIENT
Start: 2025-08-15 | End: 2025-08-15

## 2025-08-15 RX ORDER — CLINDAMYCIN HYDROCHLORIDE 150 MG/1
300 CAPSULE ORAL EVERY 8 HOURS
Qty: 60 CAPSULE | Refills: 0 | Status: SHIPPED | OUTPATIENT
Start: 2025-08-15 | End: 2025-08-25

## 2025-08-15 RX ORDER — ONDANSETRON HYDROCHLORIDE 2 MG/ML
4 INJECTION, SOLUTION INTRAVENOUS
Status: COMPLETED | OUTPATIENT
Start: 2025-08-15 | End: 2025-08-15

## 2025-08-15 RX ORDER — MORPHINE SULFATE 4 MG/ML
4 INJECTION, SOLUTION INTRAMUSCULAR; INTRAVENOUS
Refills: 0 | Status: COMPLETED | OUTPATIENT
Start: 2025-08-15 | End: 2025-08-15

## 2025-08-15 RX ADMIN — MORPHINE SULFATE 4 MG: 4 INJECTION INTRAVENOUS at 12:08

## 2025-08-15 RX ADMIN — CLINDAMYCIN PHOSPHATE 900 MG: 900 INJECTION, SOLUTION INTRAVENOUS at 12:08

## 2025-08-15 RX ADMIN — ONDANSETRON 4 MG: 2 INJECTION INTRAMUSCULAR; INTRAVENOUS at 12:08

## 2025-08-15 RX ADMIN — SODIUM CHLORIDE 1000 ML: 9 INJECTION, SOLUTION INTRAVENOUS at 11:08

## 2025-08-18 PROBLEM — L02.91 ABSCESS: Status: RESOLVED | Noted: 2025-07-07 | Resolved: 2025-08-18

## 2025-08-19 ENCOUNTER — OFFICE VISIT (OUTPATIENT)
Dept: SURGERY | Facility: CLINIC | Age: 40
End: 2025-08-19

## 2025-08-19 VITALS — HEIGHT: 72 IN | BODY MASS INDEX: 24.39 KG/M2

## 2025-08-19 DIAGNOSIS — S91.309A WOUND OF FOOT: ICD-10-CM

## 2025-08-19 DIAGNOSIS — M79.89 BILATERAL SWELLING OF FEET: Primary | ICD-10-CM

## 2025-08-19 PROCEDURE — 99214 OFFICE O/P EST MOD 30 MIN: CPT | Mod: S$PBB,,, | Performed by: STUDENT IN AN ORGANIZED HEALTH CARE EDUCATION/TRAINING PROGRAM

## 2025-08-19 PROCEDURE — 99999 PR PBB SHADOW E&M-EST. PATIENT-LVL IV: CPT | Mod: PBBFAC,,, | Performed by: STUDENT IN AN ORGANIZED HEALTH CARE EDUCATION/TRAINING PROGRAM

## 2025-08-19 PROCEDURE — 99214 OFFICE O/P EST MOD 30 MIN: CPT | Mod: PBBFAC | Performed by: STUDENT IN AN ORGANIZED HEALTH CARE EDUCATION/TRAINING PROGRAM

## 2025-08-19 RX ORDER — ACETAMINOPHEN AND CODEINE PHOSPHATE 300; 30 MG/1; MG/1
1 TABLET ORAL EVERY 6 HOURS PRN
Qty: 20 TABLET | Refills: 0 | Status: SHIPPED | OUTPATIENT
Start: 2025-08-19 | End: 2025-08-29

## 2025-08-20 DIAGNOSIS — E11.621 TYPE 2 DIABETES MELLITUS WITH FOOT ULCER: Primary | ICD-10-CM

## 2025-08-20 DIAGNOSIS — L89.229 PRESSURE ULCER OF LEFT HIP, UNSPECIFIED STAGE: ICD-10-CM

## 2025-08-20 DIAGNOSIS — L97.509 TYPE 2 DIABETES MELLITUS WITH FOOT ULCER: Primary | ICD-10-CM

## 2025-08-23 ENCOUNTER — PATIENT MESSAGE (OUTPATIENT)
Dept: SURGERY | Facility: CLINIC | Age: 40
End: 2025-08-23

## 2025-08-23 DIAGNOSIS — S91.309A WOUND OF FOOT: ICD-10-CM

## 2025-08-25 ENCOUNTER — TELEPHONE (OUTPATIENT)
Dept: SURGERY | Facility: CLINIC | Age: 40
End: 2025-08-25

## 2025-08-25 DIAGNOSIS — S91.309A WOUND OF FOOT: Primary | ICD-10-CM

## 2025-08-25 DIAGNOSIS — M79.89 BILATERAL SWELLING OF FEET: ICD-10-CM

## 2025-08-26 RX ORDER — ACETAMINOPHEN AND CODEINE PHOSPHATE 300; 30 MG/1; MG/1
1 TABLET ORAL EVERY 6 HOURS PRN
Qty: 20 TABLET | Refills: 0 | Status: SHIPPED | OUTPATIENT
Start: 2025-08-26 | End: 2025-09-05

## 2025-08-29 DIAGNOSIS — S91.309A WOUND OF FOOT: ICD-10-CM

## 2025-08-29 RX ORDER — ACETAMINOPHEN AND CODEINE PHOSPHATE 300; 30 MG/1; MG/1
1 TABLET ORAL EVERY 6 HOURS PRN
Qty: 20 TABLET | Refills: 0 | Status: CANCELLED | OUTPATIENT
Start: 2025-08-29 | End: 2025-09-08

## 2025-09-03 DIAGNOSIS — S91.309A WOUND OF FOOT: ICD-10-CM

## 2025-09-04 RX ORDER — ACETAMINOPHEN AND CODEINE PHOSPHATE 300; 30 MG/1; MG/1
1 TABLET ORAL EVERY 6 HOURS PRN
Qty: 20 TABLET | Refills: 0 | Status: SHIPPED | OUTPATIENT
Start: 2025-09-04 | End: 2025-09-14

## (undated) DEVICE — SWAB AEROBIC CULTURETTE

## (undated) DEVICE — GLOVE SENSICARE PI GRN 6

## (undated) DEVICE — GLOVE SENSICARE PI GRN 6.5

## (undated) DEVICE — GOWN ASTOUND AAMI LVL3 BLUE LG

## (undated) DEVICE — DERM CURETTE 5MM DISP

## (undated) DEVICE — ELECTRODE BLADE INSULATED 1 IN

## (undated) DEVICE — COLLECTOR SPECIMEN ANAEROBIC

## (undated) DEVICE — GLOVE SENSICARE PI SURG 6.5

## (undated) DEVICE — GLOVE SENSICARE PI SURG 8

## (undated) DEVICE — GLOVE BIOGEL PIMICRO INDIC 8.5

## (undated) DEVICE — BANDAGE GAUZE COT STRL 4.5X4.1

## (undated) DEVICE — TRAY SKIN SCRUB WET PREMIUM

## (undated) DEVICE — GLOVE SENSICARE PI SURG 7.5

## (undated) DEVICE — Device